# Patient Record
Sex: FEMALE | Race: BLACK OR AFRICAN AMERICAN | Employment: OTHER | URBAN - METROPOLITAN AREA
[De-identification: names, ages, dates, MRNs, and addresses within clinical notes are randomized per-mention and may not be internally consistent; named-entity substitution may affect disease eponyms.]

---

## 2017-06-29 PROBLEM — Z85.3 HX OF BREAST CANCER: Status: ACTIVE | Noted: 2017-06-29

## 2017-08-01 ENCOUNTER — HOSPITAL ENCOUNTER (OUTPATIENT)
Dept: CT IMAGING | Age: 72
Discharge: HOME | End: 2017-08-01
Attending: RADIOLOGY | Admitting: RADIOLOGY
Payer: MEDICARE

## 2017-08-01 VITALS
DIASTOLIC BLOOD PRESSURE: 59 MMHG | RESPIRATION RATE: 18 BRPM | HEART RATE: 81 BPM | HEIGHT: 64 IN | BODY MASS INDEX: 26.8 KG/M2 | WEIGHT: 157 LBS | SYSTOLIC BLOOD PRESSURE: 113 MMHG | OXYGEN SATURATION: 100 % | TEMPERATURE: 97 F

## 2017-08-01 DIAGNOSIS — N28.89 RENAL MASS, LEFT: ICD-10-CM

## 2017-08-01 LAB
ANION GAP BLD CALC-SCNC: 6 MMOL/L (ref 3–18)
APTT PPP: 25.8 SEC (ref 23–36.4)
BUN SERPL-MCNC: 21 MG/DL (ref 7–18)
BUN/CREAT SERPL: 21 (ref 12–20)
CALCIUM SERPL-MCNC: 9.6 MG/DL (ref 8.5–10.1)
CHLORIDE SERPL-SCNC: 103 MMOL/L (ref 100–108)
CO2 SERPL-SCNC: 29 MMOL/L (ref 21–32)
CREAT SERPL-MCNC: 1.01 MG/DL (ref 0.6–1.3)
ERYTHROCYTE [DISTWIDTH] IN BLOOD BY AUTOMATED COUNT: 15 % (ref 11.6–14.5)
GLUCOSE BLD STRIP.AUTO-MCNC: 108 MG/DL (ref 70–110)
GLUCOSE BLD STRIP.AUTO-MCNC: 78 MG/DL (ref 70–110)
GLUCOSE SERPL-MCNC: 109 MG/DL (ref 74–99)
HCT VFR BLD AUTO: 36.7 % (ref 35–45)
HGB BLD-MCNC: 11.7 G/DL (ref 12–16)
INR PPP: 0.9 (ref 0.8–1.2)
MCH RBC QN AUTO: 25.3 PG (ref 24–34)
MCHC RBC AUTO-ENTMCNC: 31.9 G/DL (ref 31–37)
MCV RBC AUTO: 79.3 FL (ref 74–97)
PLATELET # BLD AUTO: 178 K/UL (ref 135–420)
PMV BLD AUTO: 10.4 FL (ref 9.2–11.8)
POTASSIUM SERPL-SCNC: 3.9 MMOL/L (ref 3.5–5.5)
PROTHROMBIN TIME: 12.3 SEC (ref 11.5–15.2)
RBC # BLD AUTO: 4.63 M/UL (ref 4.2–5.3)
SODIUM SERPL-SCNC: 138 MMOL/L (ref 136–145)
WBC # BLD AUTO: 4.6 K/UL (ref 4.6–13.2)

## 2017-08-01 PROCEDURE — 80048 BASIC METABOLIC PNL TOTAL CA: CPT | Performed by: RADIOLOGY

## 2017-08-01 PROCEDURE — 88305 TISSUE EXAM BY PATHOLOGIST: CPT | Performed by: RADIOLOGY

## 2017-08-01 PROCEDURE — 50200 RENAL BIOPSY PERQ: CPT

## 2017-08-01 PROCEDURE — 88334 PATH CONSLTJ SURG CYTO XM EA: CPT | Performed by: RADIOLOGY

## 2017-08-01 PROCEDURE — 74011250636 HC RX REV CODE- 250/636

## 2017-08-01 PROCEDURE — 88333 PATH CONSLTJ SURG CYTO XM 1: CPT | Performed by: RADIOLOGY

## 2017-08-01 PROCEDURE — 74011250636 HC RX REV CODE- 250/636: Performed by: RADIOLOGY

## 2017-08-01 PROCEDURE — 82962 GLUCOSE BLOOD TEST: CPT

## 2017-08-01 PROCEDURE — 85610 PROTHROMBIN TIME: CPT | Performed by: RADIOLOGY

## 2017-08-01 PROCEDURE — 74011000272 HC RX REV CODE- 272

## 2017-08-01 PROCEDURE — 85027 COMPLETE CBC AUTOMATED: CPT | Performed by: RADIOLOGY

## 2017-08-01 PROCEDURE — 85730 THROMBOPLASTIN TIME PARTIAL: CPT | Performed by: RADIOLOGY

## 2017-08-01 RX ORDER — MIDAZOLAM HYDROCHLORIDE 1 MG/ML
INJECTION, SOLUTION INTRAMUSCULAR; INTRAVENOUS
Status: COMPLETED
Start: 2017-08-01 | End: 2017-08-01

## 2017-08-01 RX ORDER — MIDAZOLAM HYDROCHLORIDE 1 MG/ML
1 INJECTION, SOLUTION INTRAMUSCULAR; INTRAVENOUS
Status: DISCONTINUED | OUTPATIENT
Start: 2017-08-01 | End: 2017-08-01 | Stop reason: HOSPADM

## 2017-08-01 RX ORDER — FENTANYL CITRATE 50 UG/ML
50 INJECTION, SOLUTION INTRAMUSCULAR; INTRAVENOUS
Status: DISCONTINUED | OUTPATIENT
Start: 2017-08-01 | End: 2017-08-01 | Stop reason: HOSPADM

## 2017-08-01 RX ORDER — NALOXONE HYDROCHLORIDE 0.4 MG/ML
0.1 INJECTION, SOLUTION INTRAMUSCULAR; INTRAVENOUS; SUBCUTANEOUS
Status: DISCONTINUED | OUTPATIENT
Start: 2017-08-01 | End: 2017-08-01 | Stop reason: HOSPADM

## 2017-08-01 RX ORDER — SODIUM CHLORIDE 0.9 % (FLUSH) 0.9 %
5-10 SYRINGE (ML) INJECTION EVERY 8 HOURS
Status: DISCONTINUED | OUTPATIENT
Start: 2017-08-01 | End: 2017-08-01 | Stop reason: HOSPADM

## 2017-08-01 RX ORDER — FENTANYL CITRATE 50 UG/ML
INJECTION, SOLUTION INTRAMUSCULAR; INTRAVENOUS
Status: COMPLETED
Start: 2017-08-01 | End: 2017-08-01

## 2017-08-01 RX ORDER — SODIUM CHLORIDE 9 MG/ML
20 INJECTION, SOLUTION INTRAVENOUS CONTINUOUS
Status: DISCONTINUED | OUTPATIENT
Start: 2017-08-01 | End: 2017-08-01 | Stop reason: HOSPADM

## 2017-08-01 RX ORDER — SODIUM CHLORIDE 0.9 % (FLUSH) 0.9 %
5-10 SYRINGE (ML) INJECTION AS NEEDED
Status: DISCONTINUED | OUTPATIENT
Start: 2017-08-01 | End: 2017-08-01 | Stop reason: HOSPADM

## 2017-08-01 RX ORDER — FLUMAZENIL 0.1 MG/ML
0.2 INJECTION INTRAVENOUS
Status: DISCONTINUED | OUTPATIENT
Start: 2017-08-01 | End: 2017-08-01 | Stop reason: HOSPADM

## 2017-08-01 RX ADMIN — MIDAZOLAM HYDROCHLORIDE 1 MG: 1 INJECTION, SOLUTION INTRAMUSCULAR; INTRAVENOUS at 12:20

## 2017-08-01 RX ADMIN — MIDAZOLAM HYDROCHLORIDE 1 MG: 1 INJECTION, SOLUTION INTRAMUSCULAR; INTRAVENOUS at 12:25

## 2017-08-01 RX ADMIN — SODIUM CHLORIDE 20 ML/HR: 900 INJECTION, SOLUTION INTRAVENOUS at 09:29

## 2017-08-01 RX ADMIN — MIDAZOLAM HYDROCHLORIDE 0.5 MG: 1 INJECTION, SOLUTION INTRAMUSCULAR; INTRAVENOUS at 12:30

## 2017-08-01 RX ADMIN — FENTANYL CITRATE 50 MCG: 50 INJECTION, SOLUTION INTRAMUSCULAR; INTRAVENOUS at 12:20

## 2017-08-01 RX ADMIN — FENTANYL CITRATE 50 MCG: 50 INJECTION, SOLUTION INTRAMUSCULAR; INTRAVENOUS at 12:25

## 2017-08-01 RX ADMIN — FENTANYL CITRATE 50 MCG: 50 INJECTION INTRAMUSCULAR; INTRAVENOUS at 12:20

## 2017-08-01 RX ADMIN — FENTANYL CITRATE 25 MCG: 50 INJECTION, SOLUTION INTRAMUSCULAR; INTRAVENOUS at 12:30

## 2017-08-01 RX ADMIN — GELATIN ABSORBABLE SPONGE 12-7 MM 1 PACKAGE: 12-7 MISC at 12:38

## 2017-08-01 NOTE — PERIOP NOTES
I have reviewed discharge instructions with the patient and sister. The patient and sister verbalized understanding. Patient armband removed and shredded.

## 2017-08-01 NOTE — H&P
OUTPATIENT HISTORY AND PHYSICAL      Today 8/1/2017     Indication/Symptoms:   Judy Garcia is a 74 y.o. female with a left renal mass who presents to IR for an image-guided left renal mass biopsy with moderate sedation. Current Meds:    Prior to Admission medications    Medication Sig Start Date End Date Taking? Authorizing Provider   gabapentin (NEURONTIN) 100 mg capsule Take  by mouth three (3) times daily. Yes Historical Provider   carvedilol (COREG) 12.5 mg tablet Take  by mouth two (2) times daily (with meals). Yes Historical Provider   metFORMIN (GLUCOPHAGE) 500 mg tablet Take  by mouth two (2) times daily (with meals). Yes Historical Provider   HYDROCHLOROTHIAZIDE PO Take  by mouth daily. Yes Historical Provider   travoprost (TRAVATAN Z) 0.004 % ophthalmic solution Administer 1 Drop to both eyes every evening. Yes Historical Provider   oxybutynin chloride XL (DITROPAN XL) 10 mg CR tablet Take 10 mg by mouth daily. Historical Provider   [facility] Blue-Sod Phos-PhSal-Hyo (URIBEL) 118-10-40.8-36 mg cap capsule Take 1 Cap by mouth four (4) times daily. Historical Provider   acetaminophen (TYLENOL) 325 mg tablet Take 325 mg by mouth as needed. Historical Provider   aspirin delayed-release 81 mg tablet 81 mg. Historical Provider   oxyCODONE-acetaminophen (PERCOCET) 5-325 mg per tablet One-half to one po every day-bid prn pain 12/10/15   Eliza Ferris MD   losartan-hydrochlorothiazide [facility]) 100-12.5 mg per tablet Take 1 Tab by mouth daily. Historical Provider   NOVOTWIST 32 x 1/5 \" ndle  4/22/14   Historical Provider   multivitamin with iron tablet Take 1 Tab by mouth daily. 6/11/13   Alma Rosa Gan NP   Liraglutide (VICTOZA 3-VALERIANO) 0.6 mg/0.1 mL (18 mg/3 mL) sub-q pen 0.6 mg by SubCUTAneous route. Historical Provider   Omeprazole delayed release (PRILOSEC D/R) 20 mg tablet Take 20 mg by mouth daily.     Historical Provider   glipiZIDE (GLUCOTROL) 10 mg tablet Take 10 mg by mouth two (2) times a day. Historical Provider   lisinopril (PRINIVIL, ZESTRIL) 20 mg tablet Take  by mouth daily. Historical Provider       Allergies:    No Known Allergies    Comorbid Conditions:    Past Medical History:   Diagnosis Date    Anemia NEC     Arthritis     Diabetes ([address]     Glaucoma     Hypertension     Lumbar stenosis     MDS (myelodysplastic syndrome) (HCC)     MGUS (monoclonal gammopathy of unknown significance)           Past Surgical History:   Procedure Laterality Date    BIOPSY BONE MARROW  5/11/2010    ENDOSCOPY, COLON, DIAGNOSTIC  2005    HX BREAST LUMPECTOMY  2015    HX CARPAL TUNNEL RELEASE      Left    HX CATARACT REMOVAL Bilateral 2012    HX HYSTERECTOMY  6/1985    HX LUMBAR FUSION  3/2000    HX LUMBAR LAMINECTOMY  3/2000    NEUROLOGICAL PROCEDURE UNLISTED  2002    Spinal injection     Data:    Visit Vitals    /75 (BP 1 Location: Left arm, BP Patient Position: At rest)    Pulse 84    Temp 97.5 °F (36.4 °C)    Resp 16    Ht 5' 4\" (1.626 m)    Wt 71.2 kg (157 lb)    SpO2 100%    BMI 26.95 kg/m2   :  Recent Labs      08/01/17   0925   PLT  178     Recent Labs      08/01/17   0925   INR  0.9   APTT  25.8       The H & P and/or progress notes and any available imaging were reviewed. The risks, indications and possible alternatives to the procedure, including doing nothing, were discussed and informed consent was obtained. Physical Exam:      Mental status:   Alert and oriented. Examination specific to the procedure proposed to be performed and any co morbid conditions:   Mallampati classification 2 ,  ASA 2   Heart:   RRR. Lungs:   CTAB. No wheezes, rales or rhonchi. The patient is an appropriate candidate to undergo the planned procedure and sedation.     Tamiko Fitzgerald MD

## 2017-08-01 NOTE — PROGRESS NOTES
TRANSFER - OUT REPORT:    Verbal report given to Judit Hilton RN(name) on Shi Tamayo  being transferred to Phase 2(unit) for routine post - op       Report consisted of patients Situation, Background, Assessment and   Recommendations(SBAR). Information from the following report(s) SBAR, Kardex and MAR was reviewed with the receiving nurse. Lines:   Peripheral IV 08/01/17 Right Arm (Active)   Site Assessment Clean, dry, & intact 8/1/2017  9:28 AM   Phlebitis Assessment 0 8/1/2017  9:28 AM   Infiltration Assessment 0 8/1/2017  9:28 AM   Dressing Status Clean, dry, & intact 8/1/2017  9:28 AM   Dressing Type Tape;Transparent 8/1/2017  9:28 AM   Hub Color/Line Status Pink; Infusing 8/1/2017  9:28 AM   Alcohol Cap Used No 8/1/2017  9:28 AM        Opportunity for questions and clarification was provided.       Patient transported with:   [employer]

## 2017-08-01 NOTE — IP AVS SNAPSHOT
Karyle Olp 
 
 
 [address] [address] Patient: Barbara Mccarty MRN: [id number] ICL:9/2/2029 You are allergic to the following No active allergies Recent Documentation Height Weight BMI OB Status Smoking Status 1.626 m 71.2 kg 26.95 kg/m2 Hysterectomy Former Smoker Emergency Contacts Name Discharge Info Relation Home Work Mobile BCira Chen  Other Relative [6] [phone] Jessica Rankin  Other Relative [6] [phone] About your hospitalization You were admitted on:  August 1, 2017 You [facility] in the:  [facility] [address] You were discharged on:  August 1, 2017 Unit phone number:  [phone] Why you were hospitalized Your primary diagnosis was:  Not on File Providers Seen During Your Hospitalizations Provider Role Specialty Primary office phone Heather Zaman MD Attending Provider Radiology [phone] Your Primary Care Physician (PCP) Primary Care Physician Office Phone Office Fax Jamshid Miller [phone] Follow-up Information Follow up With Details Comments Contact Info Antwon Nieves MD   [facility] [address] 
[phone] Heather Zaman MD Schedule an appointment as soon as possible for a visit in 1 week(s)  [address] [place] [address] 
[phone] Current Discharge Medication List  
  
ASK your doctor about these medications Dose & Instructions Dispensing Information Comments Morning Noon Evening Bedtime  
 acetaminophen 325 mg tablet Commonly known as:  TYLENOL Your last dose was: Your next dose is:    
   
   
 Dose:  325 mg Take 325 mg by mouth as needed. Refills:  0  
     
   
   
   
  
 aspirin delayed-release 81 mg tablet Your last dose was: Your next dose is:    
   
   
 Dose:  81 mg  
81 mg. Refills:  0  
     
   
   
   
  
 carvedilol 12.5 mg tablet Commonly known as:  Olinda Ready Your last dose was: Your next dose is: Take  by mouth two (2) times daily (with meals). Refills:  0 DITROPAN XL 10 mg CR tablet Generic drug:  oxybutynin chloride XL Your last dose was: Your next dose is:    
   
   
 Dose:  10 mg Take 10 mg by mouth daily. Refills:  0  
     
   
   
   
  
 gabapentin 100 mg capsule Commonly known as:  NEURONTIN Your last dose was: Your next dose is: Take  by mouth three (3) times daily. Refills:  0  
     
   
   
   
  
 glipiZIDE 10 mg tablet Commonly known as:  Marlene Lands Your last dose was: Your next dose is:    
   
   
 Dose:  10 mg Take 10 mg by mouth two (2) times a day. Refills:  0 HYDROCHLOROTHIAZIDE PO Your last dose was: Your next dose is: Take  by mouth daily. Refills:  0  
     
   
   
   
  
 lisinopril 20 mg tablet Commonly known as:  Chauncey El Your last dose was: Your next dose is: Take  by mouth daily. Refills:  0  
     
   
   
   
  
 losartan-hydroCHLOROthiazide 100-12.5 mg per tablet Commonly known as:  HYZAAR Your last dose was: Your next dose is:    
   
   
 Dose:  1 Tab Take 1 Tab by mouth daily. Refills:  0  
     
   
   
   
  
 metFORMIN 500 mg tablet Commonly known as:  GLUCOPHAGE Your last dose was: Your next dose is: Take  by mouth two (2) times daily (with meals). Refills:  0  
     
   
   
   
  
 multivitamin with iron tablet Your last dose was: Your next dose is:    
   
   
 Dose:  1 Tab Take 1 Tab by mouth daily. Quantity:  30 Each Refills:  5 NOVOTWIST 32 gauge x 1/5\" Ndle Generic drug:  pen needle, diabetic Your last dose was: Your next dose is:    
   
   
  Refills:  0 Omeprazole delayed release 20 mg tablet Commonly known as:  PRILOSEC D/R Your last dose was: Your next dose is:    
   
   
 Dose:  20 mg Take 20 mg by mouth daily. Refills:  0  
     
   
   
   
  
 oxyCODONE-acetaminophen 5-325 mg per tablet Commonly known as:  PERCOCET Your last dose was: Your next dose is:    
   
   
 One-half to one po every day-bid prn pain Quantity:  45 Tab Refills:  0  
     
   
   
   
  
 TRAVATAN Z 0.004 % ophthalmic solution Generic drug:  travoprost  
   
Your last dose was: Your next dose is:    
   
   
 Dose:  1 Drop Administer 1 Drop to both eyes every evening. Refills:  0  
     
   
   
   
  
 URIBEL 118-10-40.8-36 mg Cap capsule Generic drug:  Mth-Me Blue-Sod Phos-PhSal-Hyo Your last dose was: Your next dose is:    
   
   
 Dose:  1 Cap Take 1 Cap by mouth four (4) times daily. Refills:  0  
     
   
   
   
  
 VICTOZA 0.6 mg/0.1 mL (18 mg/3 mL) sub-q pen Generic drug:  Liraglutide Your last dose was: Your next dose is:    
   
   
 Dose:  0.6 mg  
0.6 mg by SubCUTAneous route. Refills:  0 Discharge Instructions Natalie 67 General Instructions: A biopsy is the removal of a small piece of tissue for microscopic examination or testing. Healthy tissue can be obtained for the purpose of tissue-type matching for transplants. Unhealthy tissues are more commonly biopsied to diagnose disease. Renal Biopsy: This procedure is sometimes done to evaluate a transplanted kidney. It is also used to evaluate unexplained decrease in kidney function, blood, or protein in the urine.  You may see bright red blood in the urine the first 24 hours after the test. If the bleeding lasts longer, you need to call your doctor. There is a risk of infection and bleeding into the muscle, which may cause soreness. Home Care Instructions: You may resume your regular diet and medication regimen. Do not drink alcohol, drive, or make any important legal decisions in the next 24 hours. Do not lift anything heavier than a gallon of milk until the soreness goes away. You may use over the counter acetaminophen or ibuprofen for the soreness. You may apply an ice pack to the affected area for 20-30 minutes at time for the first 24 hours. After that, you may apply a heat pack. Remove dressing in 24 hours. May shower once the dressing is removed. Allow soap and water to run over the biopsy site. Pat biopsy site dry after your shower. No tub baths, swimming, or hot tub until biopsy site has healed. Call If: You should call your Physician if you have any questions or concerns about the biopsy site. Call if you should have increased pain, fever, redness, drainage, or bleeding more than a small spot on the bandage. Follow-Up Instructions: Please see your ordering doctor as he/she has requested. DISCHARGE SUMMARY from Nurse The following personal items are in your possession at time of discharge: 
 
Dental Appliances: None Home Medications: None Jewelry: Watch Clothing: Shirt, Pants, Undergarments, Socks, Footwear Other Valuables: Moon Dent (comment) (medical cards) PATIENT INSTRUCTIONS: 
 
 
F-face looks uneven A-arms unable to move or move unevenly S-speech slurred or non-existent T-time-call 911 as soon as signs and symptoms begin-DO NOT go Back to bed or wait to see if you get better-TIME IS BRAIN. Warning Signs of HEART ATTACK Call 911 if you have these symptoms: 
? Chest discomfort.  Most heart attacks involve discomfort in the center of the chest that lasts more than a few minutes, or that goes away and comes back. It can feel like uncomfortable pressure, squeezing, fullness, or pain. ? Discomfort in other areas of the upper body. Symptoms can include pain or discomfort in one or both arms, the back, neck, jaw, or stomach. ? Shortness of breath with or without chest discomfort. ? Other signs may include breaking out in a cold sweat, nausea, or lightheadedness. Don't wait more than five minutes to call [address]! Fast action can save your life. Calling 911 is almost always the fastest way to get lifesaving treatment. Emergency Medical Services staff can begin treatment when they arrive  up to an hour sooner than if someone gets to the hospital by car. The discharge information has been reviewed with the patient. The patient verbalized understanding. Discharge medications reviewed with the patient and appropriate educational materials and side effects teaching were provided. Discharge Orders None Introducing [facility] SERVICES! Allyssa Padilla introduces [employer] patient portal. Now you can access parts of your medical record, email your doctor's office, and request medication refills online. 1. In your internet browser, go to [email or web address] 2. Click on the First Time User? Click Here link in the Sign In box. You will see the New Member Sign Up page. 3. Enter your [employer] Access Code exactly as it appears below. You will not need to use this code after youve completed the sign-up process. If you do not sign up before the expiration date, you must request a new code. · [employer] Access Code: [email or web address] Expires: 9/27/2017  4:35 PM 
 
4. Enter the last four digits of your Social Security Number (xxxx) and Date of Birth (mm/dd/yyyy) as indicated and click Submit. You will be taken to the next sign-up page. 5. Create a [employer] ID. This will be your [employer] login ID and cannot be changed, so think of one that is secure and easy to remember. 6. Create a [employer] password. You can change your password at any time. 7. Enter your Password Reset Question and Answer. This can be used at a later time if you forget your password. 8. Enter your e-mail address. You will receive e-mail notification when new information is available in [address] 9. Click Sign Up. You can now view and download portions of your medical record. 10. Click the Download Summary menu link to download a portable copy of your medical information. If you have questions, please visit the Frequently Asked Questions section of the [employer] website. Remember, [employer] is NOT to be used for urgent needs. For medical emergencies, dial 911. Now available from your iPhone and Android! General Information Please provide this summary of care documentation to your next provider. Patient Signature:  ____________________________________________________________ Date:  ____________________________________________________________  
  
Dorina Lam Provider Signature:  ____________________________________________________________ Date:  ____________________________________________________________

## 2017-08-01 NOTE — DISCHARGE INSTRUCTIONS
Clarissa INSTRUCTIONS    General Instructions:     A biopsy is the removal of a small piece of tissue for microscopic examination or testing. Healthy tissue can be obtained for the purpose of tissue-type matching for transplants. Unhealthy tissues are more commonly biopsied to diagnose disease. Renal Biopsy: This procedure is sometimes done to evaluate a transplanted kidney. It is also used to evaluate unexplained decrease in kidney function, blood, or protein in the urine. You may see bright red blood in the urine the first 24 hours after the test. If the bleeding lasts longer, you need to call your doctor. There is a risk of infection and bleeding into the muscle, which may cause soreness. Home Care Instructions: You may resume your regular diet and medication regimen. Do not drink alcohol, drive, or make any important legal decisions in the next 24 hours. Do not lift anything heavier than a gallon of milk until the soreness goes away. You may use over the counter acetaminophen or ibuprofen for the soreness. You may apply an ice pack to the affected area for 20-30 minutes at time for the first 24 hours. After that, you may apply a heat pack. Remove dressing in 24 hours. May shower once the dressing is removed. Allow soap and water to run over the biopsy site. Pat biopsy site dry after your shower. No tub baths, swimming, or hot tub until biopsy site has healed. Call If: You should call your Physician if you have any questions or concerns about the biopsy site. Call if you should have increased pain, fever, redness, drainage, or bleeding more than a small spot on the bandage. Follow-Up Instructions: Please see your ordering doctor as he/she has requested.     DISCHARGE SUMMARY from Nurse    The following personal items are in your possession at time of discharge:    Dental Appliances: None        Home Medications: None  Jewelry: Watch  Clothing: Shirt, Pants, Undergarments, Socks, Footwear  Other Valuables: Cane, Purse, Other (comment) (medical cards)     PATIENT INSTRUCTIONS:    After general anesthesia or intravenous sedation, for 24 hours or while taking prescription Narcotics:  · Limit your activities  · Do not drive and operate hazardous machinery  · Do not make important personal or business decisions  · Do  not drink alcoholic beverages  · If you have not urinated within 8 hours after discharge, please contact your surgeon on call. Report the following to your surgeon:  · Excessive pain, swelling, redness or odor of or around the surgical area  · Temperature over 100.5  · Nausea and vomiting lasting longer than 4 hours or if unable to take medications  · Any signs of decreased circulation or nerve impairment to extremity: change in color, persistent  numbness, tingling, coldness or increase pain  · Any questions    *  Please give a list of your current medications to your Primary Care Provider. *  Please update this list whenever your medications are discontinued, doses are      changed, or new medications (including over-the-counter products) are added. *  Please carry medication information at all times in case of emergency situations. These are general instructions for a healthy lifestyle:    No smoking/ No tobacco products/ Avoid exposure to second hand smoke    Surgeon General's Warning:  Quitting smoking now greatly reduces serious risk to your health. Obesity, smoking, and sedentary lifestyle greatly increases your risk for illness    A healthy diet, regular physical exercise & weight monitoring are important for maintaining a healthy lifestyle    You may be retaining fluid if you have a history of heart failure or if you experience any of the following symptoms:  Weight gain of 3 pounds or more overnight or 5 pounds in a week, increased swelling in our hands or feet or shortness of breath while lying flat in bed.   Please call your doctor as soon as you notice any of these symptoms; do not wait until your next office visit. Recognize signs and symptoms of STROKE:    F-face looks uneven    A-arms unable to move or move unevenly    S-speech slurred or non-existent    T-time-call 911 as soon as signs and symptoms begin-DO NOT go       Back to bed or wait to see if you get better-TIME IS BRAIN. Warning Signs of HEART ATTACK     Call 911 if you have these symptoms:   Chest discomfort. Most heart attacks involve discomfort in the center of the chest that lasts more than a few minutes, or that goes away and comes back. It can feel like uncomfortable pressure, squeezing, fullness, or pain.  Discomfort in other areas of the upper body. Symptoms can include pain or discomfort in one or both arms, the back, neck, jaw, or stomach.  Shortness of breath with or without chest discomfort.  Other signs may include breaking out in a cold sweat, nausea, or lightheadedness. Don't wait more than five minutes to call 911 - MINUTES MATTER! Fast action can save your life. Calling 911 is almost always the fastest way to get lifesaving treatment. Emergency Medical Services staff can begin treatment when they arrive -- up to an hour sooner than if someone gets to the hospital by car. The discharge information has been reviewed with the patient. The patient verbalized understanding. Discharge medications reviewed with the patient and appropriate educational materials and side effects teaching were provided.

## 2017-09-21 ENCOUNTER — ANESTHESIA (OUTPATIENT)
Dept: SURGERY | Age: 72
End: 2017-09-21
Payer: MEDICARE

## 2017-09-21 ENCOUNTER — ANESTHESIA EVENT (OUTPATIENT)
Dept: SURGERY | Age: 72
End: 2017-09-21
Payer: MEDICARE

## 2017-09-21 ENCOUNTER — HOSPITAL ENCOUNTER (OUTPATIENT)
Dept: CT IMAGING | Age: 72
Setting detail: OBSERVATION
Discharge: HOME | End: 2017-09-22
Attending: RADIOLOGY | Admitting: INTERNAL MEDICINE
Payer: MEDICARE

## 2017-09-21 DIAGNOSIS — C64.1 MALIGNANT NEOPLASM OF RIGHT KIDNEY, EXCEPT RENAL PELVIS (HCC): ICD-10-CM

## 2017-09-21 PROBLEM — C64.9 RENAL CELL CANCER (HCC): Status: ACTIVE | Noted: 2017-09-21

## 2017-09-21 PROBLEM — C64.9 RENAL CELL CARCINOMA (HCC): Status: ACTIVE | Noted: 2017-09-21

## 2017-09-21 LAB
ANION GAP SERPL CALC-SCNC: 7 MMOL/L (ref 3–18)
APTT PPP: 26.9 SEC (ref 23–36.4)
BUN SERPL-MCNC: 21 MG/DL (ref 7–18)
BUN/CREAT SERPL: 20 (ref 12–20)
CALCIUM SERPL-MCNC: 9.2 MG/DL (ref 8.5–10.1)
CHLORIDE SERPL-SCNC: 106 MMOL/L (ref 100–108)
CO2 SERPL-SCNC: 26 MMOL/L (ref 21–32)
CREAT SERPL-MCNC: 1.04 MG/DL (ref 0.6–1.3)
ERYTHROCYTE [DISTWIDTH] IN BLOOD BY AUTOMATED COUNT: 14.5 % (ref 11.6–14.5)
GLUCOSE BLD STRIP.AUTO-MCNC: 103 MG/DL (ref 70–110)
GLUCOSE BLD STRIP.AUTO-MCNC: 120 MG/DL (ref 70–110)
GLUCOSE BLD STRIP.AUTO-MCNC: 190 MG/DL (ref 70–110)
GLUCOSE SERPL-MCNC: 110 MG/DL (ref 74–99)
HCT VFR BLD AUTO: 34.6 % (ref 35–45)
HGB BLD-MCNC: 11.1 G/DL (ref 12–16)
INR PPP: 0.9 (ref 0.8–1.2)
MCH RBC QN AUTO: 25.5 PG (ref 24–34)
MCHC RBC AUTO-ENTMCNC: 32.1 G/DL (ref 31–37)
MCV RBC AUTO: 79.5 FL (ref 74–97)
PLATELET # BLD AUTO: 178 K/UL (ref 135–420)
PMV BLD AUTO: 11.2 FL (ref 9.2–11.8)
POTASSIUM SERPL-SCNC: 5 MMOL/L (ref 3.5–5.5)
PROTHROMBIN TIME: 11.9 SEC (ref 11.5–15.2)
RBC # BLD AUTO: 4.35 M/UL (ref 4.2–5.3)
SODIUM SERPL-SCNC: 139 MMOL/L (ref 136–145)
WBC # BLD AUTO: 4.6 K/UL (ref 4.6–13.2)

## 2017-09-21 PROCEDURE — 85610 PROTHROMBIN TIME: CPT | Performed by: RADIOLOGY

## 2017-09-21 PROCEDURE — 77030026438 HC STYL ET INTUB CARD -A: Performed by: ANESTHESIOLOGY

## 2017-09-21 PROCEDURE — 99218 HC RM OBSERVATION: CPT

## 2017-09-21 PROCEDURE — 74011250637 HC RX REV CODE- 250/637: Performed by: RADIOLOGY

## 2017-09-21 PROCEDURE — 74011250636 HC RX REV CODE- 250/636: Performed by: RADIOLOGY

## 2017-09-21 PROCEDURE — 74011000250 HC RX REV CODE- 250

## 2017-09-21 PROCEDURE — 77030008683 HC TU ET CUF COVD -A: Performed by: ANESTHESIOLOGY

## 2017-09-21 PROCEDURE — 80048 BASIC METABOLIC PNL TOTAL CA: CPT | Performed by: RADIOLOGY

## 2017-09-21 PROCEDURE — 76060000035 HC ANESTHESIA 2 TO 2.5 HR

## 2017-09-21 PROCEDURE — 74011250636 HC RX REV CODE- 250/636

## 2017-09-21 PROCEDURE — 85730 THROMBOPLASTIN TIME PARTIAL: CPT | Performed by: RADIOLOGY

## 2017-09-21 PROCEDURE — 50592 PERC RF ABLATE RENAL TUMOR: CPT

## 2017-09-21 PROCEDURE — 82962 GLUCOSE BLOOD TEST: CPT

## 2017-09-21 PROCEDURE — 74011250636 HC RX REV CODE- 250/636: Performed by: INTERNAL MEDICINE

## 2017-09-21 PROCEDURE — 85027 COMPLETE CBC AUTOMATED: CPT | Performed by: RADIOLOGY

## 2017-09-21 RX ORDER — CIPROFLOXACIN 2 MG/ML
400 INJECTION, SOLUTION INTRAVENOUS ONCE
Status: COMPLETED | OUTPATIENT
Start: 2017-09-22 | End: 2017-09-22

## 2017-09-21 RX ORDER — DEXTROSE 50 % IN WATER (D50W) INTRAVENOUS SYRINGE
25-50 AS NEEDED
Status: DISCONTINUED | OUTPATIENT
Start: 2017-09-21 | End: 2017-09-21 | Stop reason: HOSPADM

## 2017-09-21 RX ORDER — ONDANSETRON 2 MG/ML
4 INJECTION INTRAMUSCULAR; INTRAVENOUS
Status: DISCONTINUED | OUTPATIENT
Start: 2017-09-21 | End: 2017-09-22 | Stop reason: HOSPADM

## 2017-09-21 RX ORDER — FENTANYL CITRATE 50 UG/ML
INJECTION, SOLUTION INTRAMUSCULAR; INTRAVENOUS AS NEEDED
Status: DISCONTINUED | OUTPATIENT
Start: 2017-09-21 | End: 2017-09-21 | Stop reason: HOSPADM

## 2017-09-21 RX ORDER — CIPROFLOXACIN 2 MG/ML
400 INJECTION, SOLUTION INTRAVENOUS EVERY 24 HOURS
Status: DISCONTINUED | OUTPATIENT
Start: 2017-09-21 | End: 2017-09-21 | Stop reason: SDUPTHER

## 2017-09-21 RX ORDER — ONDANSETRON 2 MG/ML
4 INJECTION INTRAMUSCULAR; INTRAVENOUS ONCE
Status: DISCONTINUED | OUTPATIENT
Start: 2017-09-21 | End: 2017-09-21 | Stop reason: HOSPADM

## 2017-09-21 RX ORDER — PROPOFOL 10 MG/ML
INJECTION, EMULSION INTRAVENOUS AS NEEDED
Status: DISCONTINUED | OUTPATIENT
Start: 2017-09-21 | End: 2017-09-21 | Stop reason: HOSPADM

## 2017-09-21 RX ORDER — SODIUM CHLORIDE 9 MG/ML
20 INJECTION, SOLUTION INTRAVENOUS CONTINUOUS
Status: DISCONTINUED | OUTPATIENT
Start: 2017-09-21 | End: 2017-09-22 | Stop reason: HOSPADM

## 2017-09-21 RX ORDER — IBUPROFEN 200 MG
200 TABLET ORAL AS NEEDED
COMMUNITY
End: 2017-09-22

## 2017-09-21 RX ORDER — SUCCINYLCHOLINE CHLORIDE 20 MG/ML
INJECTION INTRAMUSCULAR; INTRAVENOUS AS NEEDED
Status: DISCONTINUED | OUTPATIENT
Start: 2017-09-21 | End: 2017-09-21 | Stop reason: HOSPADM

## 2017-09-21 RX ORDER — SODIUM CHLORIDE 9 MG/ML
50 INJECTION, SOLUTION INTRAVENOUS CONTINUOUS
Status: DISCONTINUED | OUTPATIENT
Start: 2017-09-21 | End: 2017-09-22 | Stop reason: HOSPADM

## 2017-09-21 RX ORDER — GLYCOPYRROLATE 0.2 MG/ML
INJECTION INTRAMUSCULAR; INTRAVENOUS AS NEEDED
Status: DISCONTINUED | OUTPATIENT
Start: 2017-09-21 | End: 2017-09-21 | Stop reason: HOSPADM

## 2017-09-21 RX ORDER — EPHEDRINE SULFATE/0.9% NACL/PF 25 MG/5 ML
SYRINGE (ML) INTRAVENOUS AS NEEDED
Status: DISCONTINUED | OUTPATIENT
Start: 2017-09-21 | End: 2017-09-21 | Stop reason: HOSPADM

## 2017-09-21 RX ORDER — HYDROCODONE BITARTRATE AND ACETAMINOPHEN 5; 325 MG/1; MG/1
1 TABLET ORAL
Status: DISCONTINUED | OUTPATIENT
Start: 2017-09-21 | End: 2017-09-22 | Stop reason: HOSPADM

## 2017-09-21 RX ORDER — ROCURONIUM BROMIDE 10 MG/ML
INJECTION, SOLUTION INTRAVENOUS AS NEEDED
Status: DISCONTINUED | OUTPATIENT
Start: 2017-09-21 | End: 2017-09-21 | Stop reason: HOSPADM

## 2017-09-21 RX ORDER — MAGNESIUM SULFATE 100 %
4 CRYSTALS MISCELLANEOUS AS NEEDED
Status: DISCONTINUED | OUTPATIENT
Start: 2017-09-21 | End: 2017-09-21 | Stop reason: HOSPADM

## 2017-09-21 RX ORDER — ONDANSETRON 2 MG/ML
INJECTION INTRAMUSCULAR; INTRAVENOUS AS NEEDED
Status: DISCONTINUED | OUTPATIENT
Start: 2017-09-21 | End: 2017-09-21 | Stop reason: HOSPADM

## 2017-09-21 RX ORDER — LIDOCAINE HYDROCHLORIDE 20 MG/ML
INJECTION, SOLUTION EPIDURAL; INFILTRATION; INTRACAUDAL; PERINEURAL AS NEEDED
Status: DISCONTINUED | OUTPATIENT
Start: 2017-09-21 | End: 2017-09-21 | Stop reason: HOSPADM

## 2017-09-21 RX ORDER — SODIUM CHLORIDE, SODIUM LACTATE, POTASSIUM CHLORIDE, CALCIUM CHLORIDE 600; 310; 30; 20 MG/100ML; MG/100ML; MG/100ML; MG/100ML
75 INJECTION, SOLUTION INTRAVENOUS CONTINUOUS
Status: DISCONTINUED | OUTPATIENT
Start: 2017-09-21 | End: 2017-09-21 | Stop reason: HOSPADM

## 2017-09-21 RX ORDER — ZOLPIDEM TARTRATE 5 MG/1
5 TABLET ORAL
Status: DISCONTINUED | OUTPATIENT
Start: 2017-09-21 | End: 2017-09-22 | Stop reason: HOSPADM

## 2017-09-21 RX ORDER — NEOSTIGMINE METHYLSULFATE 5 MG/5 ML
SYRINGE (ML) INTRAVENOUS AS NEEDED
Status: DISCONTINUED | OUTPATIENT
Start: 2017-09-21 | End: 2017-09-21 | Stop reason: HOSPADM

## 2017-09-21 RX ORDER — FENTANYL CITRATE 50 UG/ML
INJECTION, SOLUTION INTRAMUSCULAR; INTRAVENOUS
Status: DISPENSED
Start: 2017-09-21 | End: 2017-09-21

## 2017-09-21 RX ORDER — SODIUM CHLORIDE 0.9 % (FLUSH) 0.9 %
5-10 SYRINGE (ML) INJECTION AS NEEDED
Status: DISCONTINUED | OUTPATIENT
Start: 2017-09-21 | End: 2017-09-21 | Stop reason: HOSPADM

## 2017-09-21 RX ORDER — NALOXONE HYDROCHLORIDE 0.4 MG/ML
0.2 INJECTION, SOLUTION INTRAMUSCULAR; INTRAVENOUS; SUBCUTANEOUS AS NEEDED
Status: DISCONTINUED | OUTPATIENT
Start: 2017-09-21 | End: 2017-09-21 | Stop reason: HOSPADM

## 2017-09-21 RX ORDER — HEPARIN SODIUM 5000 [USP'U]/ML
5000 INJECTION, SOLUTION INTRAVENOUS; SUBCUTANEOUS EVERY 12 HOURS
Status: DISCONTINUED | OUTPATIENT
Start: 2017-09-21 | End: 2017-09-22 | Stop reason: HOSPADM

## 2017-09-21 RX ORDER — CIPROFLOXACIN 2 MG/ML
400 INJECTION, SOLUTION INTRAVENOUS
Status: COMPLETED | OUTPATIENT
Start: 2017-09-21 | End: 2017-09-21

## 2017-09-21 RX ORDER — DEXAMETHASONE SODIUM PHOSPHATE 4 MG/ML
INJECTION, SOLUTION INTRA-ARTICULAR; INTRALESIONAL; INTRAMUSCULAR; INTRAVENOUS; SOFT TISSUE AS NEEDED
Status: DISCONTINUED | OUTPATIENT
Start: 2017-09-21 | End: 2017-09-21 | Stop reason: HOSPADM

## 2017-09-21 RX ORDER — HYDROMORPHONE HYDROCHLORIDE 2 MG/ML
0.5 INJECTION, SOLUTION INTRAMUSCULAR; INTRAVENOUS; SUBCUTANEOUS
Status: DISCONTINUED | OUTPATIENT
Start: 2017-09-21 | End: 2017-09-21 | Stop reason: HOSPADM

## 2017-09-21 RX ORDER — INSULIN LISPRO 100 [IU]/ML
INJECTION, SOLUTION INTRAVENOUS; SUBCUTANEOUS ONCE
Status: DISCONTINUED | OUTPATIENT
Start: 2017-09-21 | End: 2017-09-21 | Stop reason: HOSPADM

## 2017-09-21 RX ADMIN — FENTANYL CITRATE 50 MCG: 50 INJECTION, SOLUTION INTRAMUSCULAR; INTRAVENOUS at 10:48

## 2017-09-21 RX ADMIN — Medication 5 MG: at 09:55

## 2017-09-21 RX ADMIN — HYDROCODONE BITARTRATE AND ACETAMINOPHEN 1 TABLET: 5; 325 TABLET ORAL at 20:43

## 2017-09-21 RX ADMIN — SODIUM CHLORIDE 20 ML/HR: 900 INJECTION, SOLUTION INTRAVENOUS at 07:05

## 2017-09-21 RX ADMIN — HEPARIN SODIUM 5000 UNITS: 5000 INJECTION, SOLUTION INTRAVENOUS; SUBCUTANEOUS at 21:15

## 2017-09-21 RX ADMIN — GLYCOPYRROLATE 0.4 MG: 0.2 INJECTION INTRAMUSCULAR; INTRAVENOUS at 10:59

## 2017-09-21 RX ADMIN — LIDOCAINE HYDROCHLORIDE 80 MG: 20 INJECTION, SOLUTION EPIDURAL; INFILTRATION; INTRACAUDAL; PERINEURAL at 09:20

## 2017-09-21 RX ADMIN — ONDANSETRON 4 MG: 2 INJECTION INTRAMUSCULAR; INTRAVENOUS at 09:35

## 2017-09-21 RX ADMIN — SUCCINYLCHOLINE CHLORIDE 100 MG: 20 INJECTION INTRAMUSCULAR; INTRAVENOUS at 09:20

## 2017-09-21 RX ADMIN — PROPOFOL 160 MG: 10 INJECTION, EMULSION INTRAVENOUS at 09:20

## 2017-09-21 RX ADMIN — DEXAMETHASONE SODIUM PHOSPHATE 4 MG: 4 INJECTION, SOLUTION INTRA-ARTICULAR; INTRALESIONAL; INTRAMUSCULAR; INTRAVENOUS; SOFT TISSUE at 09:32

## 2017-09-21 RX ADMIN — ROCURONIUM BROMIDE 20 MG: 10 INJECTION, SOLUTION INTRAVENOUS at 10:00

## 2017-09-21 RX ADMIN — CIPROFLOXACIN 400 MG: 2 INJECTION, SOLUTION INTRAVENOUS at 09:16

## 2017-09-21 RX ADMIN — Medication 5 MG: at 09:51

## 2017-09-21 RX ADMIN — FENTANYL CITRATE 50 MCG: 50 INJECTION, SOLUTION INTRAMUSCULAR; INTRAVENOUS at 09:19

## 2017-09-21 RX ADMIN — Medication 3 MG: at 10:59

## 2017-09-21 NOTE — IP AVS SNAPSHOT
[address] Patient: Licha Tavera MRN: [id number] Current Discharge Medication List  
  
START taking these medications Dose & Instructions Dispensing Information Comments Morning Noon Evening Bedtime  
 ciprofloxacin HCl 500 mg tablet Commonly known as:  CIPRO Your last dose was: Your next dose is:    
   
   
 Dose:  500 mg Take 1 Tab by mouth two (2) times a day for 7 days. Quantity:  14 Tab Refills:  0 CONTINUE these medications which have NOT CHANGED Dose & Instructions Dispensing Information Comments Morning Noon Evening Bedtime  
 acetaminophen 325 mg tablet Commonly known as:  TYLENOL Your last dose was: Your next dose is:    
   
   
 Dose:  325 mg Take 325 mg by mouth as needed. Refills:  0  
     
   
   
   
  
 aspirin delayed-release 81 mg tablet Your last dose was: Your next dose is:    
   
   
 Dose:  81 mg  
81 mg. Refills:  0  
     
   
   
   
  
 carvedilol 12.5 mg tablet Commonly known as:  Carrolyn Peabody Your last dose was: Your next dose is: Take  by mouth two (2) times daily (with meals). Refills:  0 DITROPAN XL 10 mg CR tablet Generic drug:  oxybutynin chloride XL Your last dose was: Your next dose is:    
   
   
 Dose:  10 mg Take 10 mg by mouth daily. Refills:  0  
     
   
   
   
  
 gabapentin 100 mg capsule Commonly known as:  NEURONTIN Your last dose was: Your next dose is: Take  by mouth three (3) times daily. Refills:  0  
     
   
   
   
  
 glipiZIDE 10 mg tablet Commonly known as:  Velta Longs Your last dose was: Your next dose is:    
   
   
 Dose:  10 mg Take 10 mg by mouth two (2) times a day. Refills:  0 losartan-hydroCHLOROthiazide 100-12.5 mg per tablet Commonly known as:  HYZAAR Your last dose was: Your next dose is:    
   
   
 Dose:  1 Tab Take 1 Tab by mouth daily. Refills:  0  
     
   
   
   
  
 metFORMIN 500 mg tablet Commonly known as:  GLUCOPHAGE Your last dose was: Your next dose is: Take  by mouth two (2) times daily (with meals). Refills:  0  
     
   
   
   
  
 multivitamin with iron tablet Your last dose was: Your next dose is:    
   
   
 Dose:  1 Tab Take 1 Tab by mouth daily. Quantity:  30 Each Refills:  5 NOVOTWIST 32 gauge x 1/5\" Ndle Generic drug:  pen needle, diabetic Your last dose was: Your next dose is:    
   
   
  Refills:  0 Omeprazole delayed release 20 mg tablet Commonly known as:  PRILOSEC D/R Your last dose was: Your next dose is:    
   
   
 Dose:  20 mg Take 20 mg by mouth daily. Refills:  0  
     
   
   
   
  
 oxyCODONE-acetaminophen 5-325 mg per tablet Commonly known as:  PERCOCET Your last dose was: Your next dose is:    
   
   
 One-half to one po every day-bid prn pain Quantity:  45 Tab Refills:  0  
     
   
   
   
  
 TRAVATAN Z 0.004 % ophthalmic solution Generic drug:  travoprost  
   
Your last dose was: Your next dose is:    
   
   
 Dose:  1 Drop Administer 1 Drop to both eyes every evening. Refills:  0  
     
   
   
   
  
 URIBEL 118-10-40.8-36 mg Cap capsule Generic drug:  Mth-Me Blue-Sod Phos-PhSal-Hyo Your last dose was: Your next dose is:    
   
   
 Dose:  1 Cap Take 1 Cap by mouth four (4) times daily. Refills:  0  
     
   
   
   
  
 VICTOZA 0.6 mg/0.1 mL (18 mg/3 mL) Pnij Generic drug:  Liraglutide Your last dose was: Your next dose is:    
   
   
 Dose:  0.6 mg  
0.6 mg by SubCUTAneous route. Refills:  0 STOP taking these medications ADVIL 200 mg tablet Generic drug:  ibuprofen HYDROCHLOROTHIAZIDE PO  
   
  
 lisinopril 20 mg tablet Commonly known as:  Trish Marin Where to Get Your Medications These medications were sent to  [facility] [address] Phone:  [phone]  
  ciprofloxacin HCl 500 mg tablet

## 2017-09-21 NOTE — PERIOP NOTES
TRANSFER - OUT REPORT:    Verbal report given to [facility] LPN on Shi Tamayo  being transferred to [facility] for routine post - op       Report consisted of patients Situation, Background, Assessment and   Recommendations(SBAR). Information from the following report(s) SBAR, OR Summary, Procedure Summary, Intake/Output, MAR and Recent Results was reviewed with the receiving nurse. Lines:   Peripheral IV 09/21/17 Right Wrist (Active)   Site Assessment Clean, dry, & intact 9/21/2017 11:03 AM   Phlebitis Assessment 0 9/21/2017 11:03 AM   Infiltration Assessment 0 9/21/2017 11:03 AM   Dressing Status Clean, dry, & intact 9/21/2017 11:03 AM   Dressing Type Transparent 9/21/2017 11:03 AM   Hub Color/Line Status Pink; Infusing;Patent 9/21/2017 11:03 AM   Alcohol Cap Used No 9/21/2017  7:02 AM        Opportunity for questions and clarification was provided.       Patient transported with:   [employer]

## 2017-09-21 NOTE — ANESTHESIA PREPROCEDURE EVALUATION
Anesthetic History   No history of anesthetic complications            Review of Systems / Medical History  Patient summary reviewed and pertinent labs reviewed    Pulmonary  Within defined limits                 Neuro/Psych   Within defined limits           Cardiovascular    Hypertension              Exercise tolerance: >4 METS     GI/Hepatic/Renal         Renal disease      Comments: Kidney tumor Endo/Other    Diabetes: well controlled, type 2    Arthritis and cancer     Other Findings   Comments:   Risk Factors for Postoperative nausea/vomiting:       History of postoperative nausea/vomiting? NO       Female? YES       Motion sickness? NO       Intended opioid administration for postoperative analgesia? NO      Smoking Abstinence  Current Smoker? NO  Elective Surgery? YES  Seen preoperatively by anesthesiologist or proxy prior to day of surgery? YES  Pt abstained from smoking 24 hours prior to anesthesia?  YES           Physical Exam    Airway  Mallampati: II  TM Distance: > 6 cm  Neck ROM: normal range of motion   Mouth opening: Normal     Cardiovascular  Regular rate and rhythm,  S1 and S2 normal,  no murmur, click, rub, or gallop             Dental  No notable dental hx       Pulmonary  Breath sounds clear to auscultation               Abdominal  GI exam deferred       Other Findings            Anesthetic Plan    ASA: 3  Anesthesia type: MAC          Induction: Intravenous  Anesthetic plan and risks discussed with: Patient

## 2017-09-21 NOTE — H&P
OUTPATIENT HISTORY AND PHYSICAL      Today 9/21/2017     Indication/Symptoms:   Bill Walker is a 74 y.o. female here for left interpolar RCC (papillary) microwave ablation. Bx proven. Current Meds:    Prior to Admission medications    Medication Sig Start Date End Date Taking? Authorizing Provider   ibuprofen (ADVIL) 200 mg tablet Take 200 mg by mouth as needed for Pain. Yes Historical Provider   losartan-hydrochlorothiazide (HYZAAR) 100-12.5 mg per tablet Take 1 Tab by mouth daily. Yes Historical Provider   carvedilol (COREG) 12.5 mg tablet Take  by mouth two (2) times daily (with meals). Yes Historical Provider   metFORMIN (GLUCOPHAGE) 500 mg tablet Take  by mouth two (2) times daily (with meals). Yes Historical Provider   oxybutynin chloride XL (DITROPAN XL) 10 mg CR tablet Take 10 mg by mouth daily. Historical Provider   [facility]-Me Blue-Sod Phos-PhSal-Hyo (URIBEL) 118-10-40.8-36 mg cap capsule Take 1 Cap by mouth four (4) times daily. Historical Provider   acetaminophen (TYLENOL) 325 mg tablet Take 325 mg by mouth as needed. Historical Provider   aspirin delayed-release 81 mg tablet 81 mg. Historical Provider   oxyCODONE-acetaminophen (PERCOCET) 5-325 mg per tablet One-half to one po every day-bid prn pain 12/10/15   Kathy Hall MD   gabapentin (NEURONTIN) 100 mg capsule Take  by mouth three (3) times daily. Historical Provider   NOVOTWIST 32 x 1/5 \" ndle  4/22/14   Historical Provider   multivitamin with iron tablet Take 1 Tab by mouth daily. 6/11/13   Joselyn Schultz NP   Liraglutide (VICTOZA 3-VALERIANO) 0.6 mg/0.1 mL (18 mg/3 mL) sub-q pen 0.6 mg by SubCUTAneous route. Historical Provider   Omeprazole delayed release (PRILOSEC D/R) 20 mg tablet Take 20 mg by mouth daily. Historical Provider   glipiZIDE (GLUCOTROL) 10 mg tablet Take 10 mg by mouth two (2) times a day. Historical Provider   lisinopril (PRINIVIL, ZESTRIL) 20 mg tablet Take  by mouth daily. Historical Provider   HYDROCHLOROTHIAZIDE PO Take  by mouth daily. Historical Provider   travoprost (TRAVATAN Z) 0.004 % ophthalmic solution Administer 1 Drop to both eyes every evening. Historical Provider       Allergies:    No Known Allergies    Comorbid Conditions:    Past Medical History:   Diagnosis Date    Anemia NEC     Arthritis     Diabetes ([address]     Glaucoma     Hypertension     Lumbar stenosis     MDS (myelodysplastic syndrome) (HCC)     MGUS (monoclonal gammopathy of unknown significance)           Past Surgical History:   Procedure Laterality Date    BIOPSY BONE MARROW  5/11/2010    ENDOSCOPY, COLON, DIAGNOSTIC  2005    HX BREAST LUMPECTOMY  2015    HX CARPAL TUNNEL RELEASE      Left    HX CATARACT REMOVAL Bilateral 2012    HX HYSTERECTOMY  6/1985    HX LUMBAR FUSION  3/2000    HX LUMBAR LAMINECTOMY  3/2000    NEUROLOGICAL PROCEDURE UNLISTED  2002    Spinal injection     Data:    Visit Vitals    /83 (BP 1 Location: Right arm, BP Patient Position: At rest)    Pulse 70    Temp 97.7 °F (36.5 °C)    Resp 18    Ht 5' 4\" (1.626 m)    Wt 70.9 kg (156 lb 4 oz)    SpO2 99%    BMI 26.82 kg/m2   :  Recent Labs      09/21/17   0702   PLT  178     Recent Labs      09/21/17   0702   INR  0.9   APTT  26.9       The H & P and/or progress notes and any available imaging were reviewed. The risks, indications and possible alternatives to the procedure, including doing nothing, were discussed and informed consent was obtained. Physical Exam:      Mental status:   Alert and oriented. Examination specific to the procedure proposed to be performed and any co morbid conditions:   Mallampati classification 2 ,  ASA2   Heart:   RRR. Lungs:   CTAB. No wheezes, rales or rhonchi. The patient is an appropriate candidate to undergo the planned procedure and sedation.     Devin Acuna MD

## 2017-09-21 NOTE — ANESTHESIA POSTPROCEDURE EVALUATION
Post-Anesthesia Evaluation and Assessment    Patient: George Alejandro MRN: [id number]  SSN: [id number]    YOB: 1945  Age: 74 y.o. Sex: female       Cardiovascular Function/Vital Signs  Visit Vitals    /69    Pulse 70    Temp 36.4 °C (97.6 °F)    Resp 16    Ht 5' 4\" (1.626 m)    Wt 70.9 kg (156 lb 4 oz)    SpO2 100%    BMI 26.82 kg/m2       Patient is status post MAC anesthesia for * No procedures listed *. Nausea/Vomiting: None    Postoperative hydration reviewed and adequate. Pain:  Pain Scale 1: Numeric (0 - 10) (09/21/17 1119)  Pain Intensity 1: 0 (09/21/17 1119)   Managed    Neurological Status:   Neuro (WDL): Within Defined Limits (09/21/17 1119)   At baseline    Mental Status and Level of Consciousness: Arousable    Pulmonary Status:   O2 Device: Room air (09/21/17 1138)   Adequate oxygenation and airway patent    Complications related to anesthesia: None    Post-anesthesia assessment completed.  No concerns        Signed By: Bill Pham MD     September 21, 2017

## 2017-09-21 NOTE — IP AVS SNAPSHOT
Kerrymiladarvind Vallejoon 
 
 
 [address] Patient: Shi Tamayo MRN: [id number] AAM:[birth year] You are allergic to the following No active allergies Recent Documentation Height Weight BMI OB Status Smoking Status 1.626 m 70.9 kg 26.82 kg/m2 Hysterectomy Former Smoker Emergency Contacts Name Discharge Info Relation Home Work Mobile Marline Baig DISCHARGE CAREGIVER [3] Other Relative [6] [phone] Evelyn Lob DISCHARGE CAREGIVER [3] Sister [23] [phone] About your hospitalization You were admitted on:  September 21, 2017 You last received care in the:  [facility] [address] 1982 You were discharged on:  September 22, 2017 Unit phone number:  [phone] Why you were hospitalized Your primary diagnosis was:  Not on File Your diagnoses also included:  Renal Cell Carcinoma (Hcc), Renal Cell Cancer (Hcc) Providers Seen During Your Hospitalizations Provider Role Specialty Primary office phone Madai Villarreal MD Attending Provider Radiology [phone] Jimi Mcmahon MD Attending Provider Internal Medicine [phone] Your Primary Care Physician (PCP) Primary Care Physician Office Phone Office Fax Veronica Calles [phone] Follow-up Information Follow up With Details Comments Contact Info Theron Trujillo MD Go on 10/12/2017 @ 8:15 a.m. Office will call patient with a sooner appointment as soon as it is available. [address] [place] [address] 
[phone] Madai Villarreal MD Go on 10/18/2017 @ 3:00 p.m.  [address] [facility] [address] [place] [address] 
[phone] Your Appointments Wednesday September 27, 2017 10:15 AM EDT Any with Sara Ibanez MD  
[facility] ([facility])  Vashti Little [address] 
 [address] [facility]  
[phone] Current Discharge Medication List  
  
START taking these medications Dose & Instructions Dispensing Information Comments Morning Noon Evening Bedtime  
 ciprofloxacin HCl 500 mg tablet Commonly known as:  CIPRO Your last dose was: Your next dose is:    
   
   
 Dose:  500 mg Take 1 Tab by mouth two (2) times a day for 7 days. Quantity:  14 Tab Refills:  0 CONTINUE these medications which have NOT CHANGED Dose & Instructions Dispensing Information Comments Morning Noon Evening Bedtime  
 acetaminophen 325 mg tablet Commonly known as:  TYLENOL Your last dose was: Your next dose is:    
   
   
 Dose:  325 mg Take 325 mg by mouth as needed. Refills:  0  
     
   
   
   
  
 aspirin delayed-release 81 mg tablet Your last dose was: Your next dose is:    
   
   
 Dose:  81 mg  
81 mg. Refills:  0  
     
   
   
   
  
 carvedilol 12.5 mg tablet Commonly known as:  Mónica Chavez Your last dose was: Your next dose is: Take  by mouth two (2) times daily (with meals). Refills:  0 DITROPAN XL 10 mg CR tablet Generic drug:  oxybutynin chloride XL Your last dose was: Your next dose is:    
   
   
 Dose:  10 mg Take 10 mg by mouth daily. Refills:  0  
     
   
   
   
  
 gabapentin 100 mg capsule Commonly known as:  NEURONTIN Your last dose was: Your next dose is: Take  by mouth three (3) times daily. Refills:  0  
     
   
   
   
  
 glipiZIDE 10 mg tablet Commonly known as:  Joycelyn Rubin Your last dose was: Your next dose is:    
   
   
 Dose:  10 mg Take 10 mg by mouth two (2) times a day. Refills:  0  
     
   
   
   
  
 losartan-hydroCHLOROthiazide 100-12.5 mg per tablet Commonly known as:  HYZAAR  
   
 Your last dose was: Your next dose is:    
   
   
 Dose:  1 Tab Take 1 Tab by mouth daily. Refills:  0  
     
   
   
   
  
 metFORMIN 500 mg tablet Commonly known as:  GLUCOPHAGE Your last dose was: Your next dose is: Take  by mouth two (2) times daily (with meals). Refills:  0  
     
   
   
   
  
 multivitamin with iron tablet Your last dose was: Your next dose is:    
   
   
 Dose:  1 Tab Take 1 Tab by mouth daily. Quantity:  30 Each Refills:  5 NOVOTWIST 32 gauge x 1/5\" Ndle Generic drug:  pen needle, diabetic Your last dose was: Your next dose is:    
   
   
  Refills:  0 Omeprazole delayed release 20 mg tablet Commonly known as:  PRILOSEC D/R Your last dose was: Your next dose is:    
   
   
 Dose:  20 mg Take 20 mg by mouth daily. Refills:  0  
     
   
   
   
  
 oxyCODONE-acetaminophen 5-325 mg per tablet Commonly known as:  PERCOCET Your last dose was: Your next dose is:    
   
   
 One-half to one po every day-bid prn pain Quantity:  45 Tab Refills:  0  
     
   
   
   
  
 TRAVATAN Z 0.004 % ophthalmic solution Generic drug:  travoprost  
   
Your last dose was: Your next dose is:    
   
   
 Dose:  1 Drop Administer 1 Drop to both eyes every evening. Refills:  0  
     
   
   
   
  
 URIBEL 118-10-40.8-36 mg Cap capsule Generic drug:  Mth-Me Blue-Sod Phos-PhSal-Hyo Your last dose was: Your next dose is:    
   
   
 Dose:  1 Cap Take 1 Cap by mouth four (4) times daily. Refills:  0  
     
   
   
   
  
 VICTOZA 0.6 mg/0.1 mL (18 mg/3 mL) Pnij Generic drug:  Liraglutide Your last dose was: Your next dose is:    
   
   
 Dose:  0.6 mg  
0.6 mg by SubCUTAneous route. Refills:  0 STOP taking these medications ADVIL 200 mg tablet Generic drug:  ibuprofen HYDROCHLOROTHIAZIDE PO  
   
  
 lisinopril 20 mg tablet Commonly known as:  Prabhjot Chan Where to Get Your Medications These medications were sent to [facility] [place] Phone:  [phone]  
  ciprofloxacin HCl 500 mg tablet Discharge Instructions Kidney Cancer: Care Instructions Your Care Instructions Kidney cancer is when abnormal cells grow out of control in one or both of the kidneys. Your doctor will do tests to see if the cancer is in the kidney only or has spread to other parts of the body. Then you and your doctor can decide on treatment. Surgery may be used to remove the cancer and all or part of the kidney. If you cannot have surgery, you may have radiation or treatment that cuts off the blood supply to the cancer (arterial embolization). You also may have medicine that kills cancer cells (chemotherapy). And your doctor may recommend immunotherapy, which uses the body's own immune system to treat an illness. Many people still have the use of one or both kidneys after treatment for early kidney cancer. If you do not have a working kidney after treatment, you will need to have your blood cleaned by a machine (dialysis) or have a kidney transplant. Being treated for cancer can weaken your body, and you may feel very tired. Home treatment can help you feel better. Finding out that you have cancer is scary. You may feel many emotions and may need some help coping. Seek out family, friends, and counselors for support. You also can do things at home to make yourself feel better while you go through treatment. Call the [address] [place], [place] [address]luis Raya ([phone]) or visit its website at 0317 [employer]. org for more information. Follow-up care is a key part of your treatment and safety.  Be sure to make and go to all appointments, and call your doctor if you are having problems. It's also a good idea to know your test results and keep a list of the medicines you take. How can you care for yourself at home? · Take your medicines exactly as prescribed. Call your doctor if you think you are having a problem with your medicine. You may get medicine for nausea and vomiting if you have these side effects. · Follow your doctor's instructions to relieve pain. Pain from cancer and surgery can almost always be controlled. Use pain medicine when you first notice pain, before it becomes severe. · Eat healthy food. If you do not feel like eating, try to eat food that has protein and extra calories to keep up your strength and prevent weight loss. Drink liquid meal replacements for extra calories and protein. Try to eat your main meal early. Your doctor also may recommend a special diet. · Get some physical activity every day, but do not get too tired. Keep doing the hobbies you enjoy as your energy allows. · Get enough sleep. Try using a sleep mask and earplugs at night, and keep your bedroom dark, cool, and quiet. · Do not smoke. Smoking can make kidney cancer worse. If you need help quitting, talk to your doctor about stop-smoking programs and medicines. These can increase your chances of quitting for good. · Take steps to control your stress and workload. Learn relaxation techniques. ¨ Share your feelings. Stress and tension affect our emotions. By expressing your feelings to others, you may be able to understand and cope with them. ¨ Consider joining a support group. Talking about a problem with your spouse, a good friend, or other people with similar problems is a good way to reduce tension and stress. ¨ Express yourself through art. Try writing, crafts, dance, or art to relieve stress. Some dance, writing, or art groups may be available just for people who have cancer. ¨ Be kind to your body and mind. Getting enough sleep, eating a healthy diet, and taking time to do things you enjoy can contribute to an overall feeling of balance in your life and help reduce stress. ¨ Get help if you need it. Discuss your concerns with your doctor or counselor. · If you are vomiting or have diarrhea: ¨ Drink plenty of fluids (enough so that your urine is light yellow or clear like water) to prevent dehydration. Choose water and other caffeine-free clear liquids. If you have kidney, heart, or liver disease and have to limit fluids, talk with your doctor before you increase the amount of fluids you drink. ¨ When you are able to eat, try clear soups, mild foods, and liquids until all symptoms are gone for 12 to 48 hours. Other good choices include dry toast, crackers, cooked cereal, and gelatin dessert, such as Jell-O. · If you have not already done so, prepare a list of advance directives. Advance directives are instructions to your doctor and family members about what kind of care you want if you become unable to speak or express yourself. When should you call for help? Call 911 anytime you think you may need emergency care. For example, call if: 
· You are very short of breath. · You have sudden or severe chest pain. Call your doctor now or seek immediate medical care if: 
· You have new or more blood in your urine. · You have trouble urinating or can urinate only very small amounts. · You have symptoms of a urinary infection. For example: ¨ You have blood or pus in your urine. ¨ You have pain in your back just below your rib cage. This is called flank pain. ¨ You have a fever, chills, or body aches. ¨ It hurts to urinate. ¨ You have groin or belly pain. Watch closely for changes in your health, and be sure to contact your doctor if: 
· You have nausea or vomiting. · Your pain is not controlled by medicine. · You do not get better as expected. Where can you learn more? Go to http://irma-randy.info/. Enter N160 in the search box to learn more about \"Kidney Cancer: Care Instructions. \" Current as of: 2016 Content Version: 11.3 © 7256-6201 [employer]. Care instructions adapted under license by [employer] (which disclaims liability or warranty for this information). If you have questions about a medical condition or this instruction, always ask your healthcare professional. [place] [address] any warranty or liability for your use of this information. Patient armband removed and shredded MyChart Activation Thank you for requesting access to We [employer] It. Please follow the instructions below to securely access and download your online medical record. [employer] [facility] It allows you to send messages to your doctor, view your test results, renew your prescriptions, schedule appointments, and more. How Do I Sign Up? 1. In your internet browser, go to [email or web address] 
2. Click on the First Time User? Click Here link in the Sign In box. You will be redirect to the New Member Sign Up page. 3. Enter your We [employer] It Access Code exactly as it appears below. You will not need to use this code after youve completed the sign-up process. If you do not sign up before the expiration date, you must request a new code. We [facility] It Access Code: [email or web address] Expires: 2017  4:35 PM (This is the date your We [employer] It access code will ) 4. Enter the last four digits of your Social Security Number (xxxx) and Date of Birth (mm/dd/yyyy) as indicated and click Submit. You will be taken to the next sign-up page. 5. Create a We Heart It ID. This will be your We [employer] It login ID and cannot be changed, so think of one that is secure and easy to remember. 6. Create a We Heart It password. You can change your password at any time. 7. Enter your Password Reset Question and Answer.  This can be used at a later time if you forget your password. 8. Enter your e-mail address. You will receive e-mail notification when new information is available in [address] 9. Click Sign Up. You can now view and download portions of your medical record. 10. Click the Download Summary menu link to download a portable copy of your medical information. Additional Information If you have questions, please visit the Frequently Asked Questions section of the logtrust website at [email or web address] Remember, logtrust is NOT to be used for urgent needs. For medical emergencies, dial 911. DISCHARGE SUMMARY from Nurse The following personal items are in your possession at time of discharge: 
 
Dental Appliances: None Home Medications: None Jewelry: Anuja Dubose Clothing: Pants, Shirt, Other (comment), Undergarments, Socks, Footwear (vest) Other Valuables: Velasquez Huitron PATIENT INSTRUCTIONS: 
 
 
F-face looks uneven A-arms unable to move or move unevenly S-speech slurred or non-existent T-time-call 911 as soon as signs and symptoms begin-DO NOT go Back to bed or wait to see if you get better-TIME IS BRAIN. Warning Signs of HEART ATTACK Call 911 if you have these symptoms: 
? Chest discomfort. Most heart attacks involve discomfort in the center of the chest that lasts more than a few minutes, or that goes away and comes back. It can feel like uncomfortable pressure, squeezing, fullness, or pain. ? Discomfort in other areas of the upper body. Symptoms can include pain or discomfort in one or both arms, the back, neck, jaw, or stomach. ? Shortness of breath with or without chest discomfort. ? Other signs may include breaking out in a cold sweat, nausea, or lightheadedness. Don't wait more than five minutes to call [address]! Fast action can save your life. Calling 911 is almost always the fastest way to get lifesaving treatment. Emergency Medical Services staff can begin treatment when they arrive  up to an hour sooner than if someone gets to the hospital by car. The discharge information has been reviewed with the patient. The patient verbalized understanding. Discharge medications reviewed with the patient and appropriate educational materials and side effects teaching were provided. Discharge Instructions Attachments/References CIPROFLOXACIN (BY MOUTH) (ENGLISH) Discharge Orders None Introducing [facility] SERVICES! [employer] introduces Droplr patient portal. Now you can access parts of your medical record, email your doctor's office, and request medication refills online. 1. In your internet browser, go to [email or web address] 2. Click on the First Time User? Click Here link in the Sign In box. You will see the New Member Sign Up page. 3. Enter your Droplr Access Code exactly as it appears below. You will not need to use this code after youve completed the sign-up process. If you do not sign up before the expiration date, you must request a new code. · Droplr Access Code: [email or web address] Expires: 9/27/2017  4:35 PM 
 
4. Enter the last four digits of your Social Security Number (xxxx) and Date of Birth (mm/dd/yyyy) as indicated and click Submit. You will be taken to the next sign-up page. 5. Create a Dekkot ID. This will be your Dekkot login ID and cannot be changed, so think of one that is secure and easy to remember. 6. Create a Dekkot password. You can change your password at any time. 7. Enter your Password Reset Question and Answer. This can be used at a later time if you forget your password. 8. Enter your e-mail address. You will receive e-mail notification when new information is available in [address] 9. Click Sign Up. You can now view and download portions of your medical record. 10. Click the Download Summary menu link to download a portable copy of your medical information. If you have questions, please visit the Frequently Asked Questions section of the BrightScope website. Remember, BrightScope is NOT to be used for urgent needs. For medical emergencies, dial 911. Now available from your iPhone and Android! General Information Please provide this summary of care documentation to your next provider. Patient Signature:  ____________________________________________________________ Date:  ____________________________________________________________  
  
Dorina Lam Provider Signature:  ____________________________________________________________ Date:  ____________________________________________________________ More Information Ciprofloxacin (By mouth) Ciprofloxacin (twd-lto-NZBW-a-sin) Treats infections and plague. This medicine is a quinolone antibiotic. Brand Name(s): Cipro There may be other brand names for this medicine. When This Medicine Should Not Be Used: This medicine is not right for everyone. Do not use it if you had an allergic reaction to ciprofloxacin or to similar medicines. How to Use This Medicine:  
Liquid, Tablet, Long Acting Tablet · Your doctor will tell you how much medicine to use. Do not use more than directed. Take this medicine at the same time each day. · You may take this medicine with or without food. Do not take this medicine with only a source of calcium, such as milk, yogurt, or juice that contains added calcium.  You may have foods or drinks that contain calcium as part of a larger meal. 
· Swallow the extended-release tablet whole. Do not crush, break, or chew it. · Oral liquid: Shake for at least 15 seconds just before each use. The liquid has small beads floating in it. Do not chew the beads when you drink the liquid. Measure the oral liquid medicine with a marked measuring spoon, oral syringe, or medicine cup. · Tablet: Swallow whole. Do not break, crush, or chew it. · Drink extra fluids so you will urinate more often and help prevent kidney problems. · Take all of the medicine in your prescription to clear up your infection, even if you feel better after the first few doses. · This medicine should come with a Medication Guide. Ask your pharmacist for a copy if you do not have one. · Missed dose: Take a dose as soon as you remember. If it is almost time for your next dose, wait until then and take a regular dose. Do not take extra medicine to make up for a missed dose. · Store the medicine in a closed container at room temperature, away from heat, moisture, and direct light. Throw away any leftover liquid medicine after 14 days. Drugs and Foods to Avoid: Ask your doctor or pharmacist before using any other medicine, including over-the-counter medicines, vitamins, and herbal products. · Do not use this medicine together with tizanidine. · Some foods and medicines can affect how ciprofloxacin works. Tell your doctor if you are using any of the following: ¨ Clozapine, cyclosporine, duloxetine, lidocaine, methotrexate, olanzapine, pentoxifylline, phenytoin, probenecid, ropinirole, sildenafil, theophylline ¨ Antibiotic (including azithromycin, clarithromycin, erythromycin) ¨ Blood thinner (including warfarin) ¨ Diabetes medicine (including glimepiride, glyburide) ¨ Medicine for depression or mental illness ¨ Medicine for heart rhythm problems (including amiodarone, procainamide, quinidine, sotalol) ¨ NSAID pain medicine (including aspirin, celecoxib, diclofenac, ibuprofen, naproxen) ¨ Steroid medicine (including hydrocortisone, methylprednisolone, prednisone) · Take ciprofloxacin at least 2 hours before or 6 hours after you take antacids containing aluminum or magnesium, calcium, zinc, iron, lanthanum, sevelamer, sucralfate, and didanosine. This includes vitamin/mineral supplements. · This medicine slows the digestion of caffeine, so it might affect you for longer than normal. 
Warnings While Using This Medicine: · Tell your doctor if you are pregnant or breastfeeding, or if you have kidney disease, liver disease, diabetes, heart disease, myasthenia gravis, or a history of heart rhythm problems (such as prolonged QT interval) or seizures. Tell your doctor if you have ever had tendon or joint problems, including rheumatoid arthritis, or if you have received a transplant. · This medicine may cause the following problems: 
¨ Tendinitis and tendon rupture (may happen after treatment ends) ¨ Liver damage ¨ Nerve damage in the arms or legs ¨ Heart rhythm changes ¨ Changes in blood sugar levels · This medicine may make you dizzy, drowsy, or lightheaded. Do not drive or do anything that could be dangerous until you know how this medicine affects you. · This medicine can cause diarrhea. Call your doctor if the diarrhea becomes severe, does not stop, or is bloody. Do not take any medicine to stop diarrhea until you have talked to your doctor. Diarrhea can occur 2 months or more after you stop taking this medicine. · This medicine may make your skin more sensitive to sunlight. Wear sunscreen. Do not use sunlamps or tanning beds. · Call your doctor if your symptoms do not improve or if they get worse. · Keep all medicine out of the reach of children. Never share your medicine with anyone. Possible Side Effects While Using This Medicine: Call your doctor right away if you notice any of these side effects: · Allergic reaction: Itching or hives, swelling in your face or hands, swelling or tingling in your mouth or throat, chest tightness, trouble breathing · Blistering, peeling, red skin rash · Dark-colored urine or pale stools, nausea, vomiting, loss of appetite, pain in your upper stomach, yellow skin or eyes · Diarrhea that may contain blood · Fainting, dizziness, or lightheadedness · Fast, slow, or uneven heartbeat · Numbness, tingling, weakness, or burning pain in your hands, arms, legs, or feet · Pain, stiffness, swelling, or bruises around your ankle, leg, shoulder, or other joint · Seizures, severe headache, unusual thoughts or behaviors, trouble sleeping, feeling anxious, confused, or depressed, seeing, hearing, or feeling things that are not there · Unusual bleeding, bruising, or weakness If you notice other side effects that you think are caused by this medicine, tell your doctor. Call your doctor for medical advice about side effects. You may report side effects to FDA at 4-964-FDA-5781 © 2017 [facility] [place][note 2] Information is for End User's use only and may not be sold, redistributed or otherwise used for commercial purposes. The above information is an  only. It is not intended as medical advice for individual conditions or treatments. Talk to your doctor, nurse or pharmacist before following any medical regimen to see if it is safe and effective for you.

## 2017-09-21 NOTE — PROCEDURES
RADIOLOGY POST PROCEDURE NOTE     September 21, 2017       11:45 AM     Preoperative Diagnosis:   Left RCC. Postoperative Diagnosis:  Same. :  Dr. Vladimir Hernández    Assistant:  None. Type of Anesthesia: 1% plain lidocaine and GETA    Procedure/Description:  Ct guided left interpolar RCC microwave ablation. Findings:   No bleeding. Estimated blood Loss:  Minimal    Specimen Removed:   no    Blood transfusions:  None. Implants:  None.     Complications: None    Condition: Stable    Discharge Plan:  continue present therapy    Andre Otero MD

## 2017-09-22 VITALS
RESPIRATION RATE: 22 BRPM | BODY MASS INDEX: 26.67 KG/M2 | WEIGHT: 156.25 LBS | DIASTOLIC BLOOD PRESSURE: 73 MMHG | TEMPERATURE: 97.6 F | HEIGHT: 64 IN | HEART RATE: 79 BPM | OXYGEN SATURATION: 99 % | SYSTOLIC BLOOD PRESSURE: 156 MMHG

## 2017-09-22 LAB
ANION GAP SERPL CALC-SCNC: 7 MMOL/L (ref 3–18)
BASOPHILS # BLD: 0 K/UL (ref 0–0.06)
BASOPHILS NFR BLD: 0 % (ref 0–2)
BUN SERPL-MCNC: 23 MG/DL (ref 7–18)
BUN/CREAT SERPL: 21 (ref 12–20)
CALCIUM SERPL-MCNC: 8.7 MG/DL (ref 8.5–10.1)
CHLORIDE SERPL-SCNC: 107 MMOL/L (ref 100–108)
CO2 SERPL-SCNC: 26 MMOL/L (ref 21–32)
CREAT SERPL-MCNC: 1.08 MG/DL (ref 0.6–1.3)
DIFFERENTIAL METHOD BLD: ABNORMAL
EOSINOPHIL # BLD: 0 K/UL (ref 0–0.4)
EOSINOPHIL NFR BLD: 0 % (ref 0–5)
ERYTHROCYTE [DISTWIDTH] IN BLOOD BY AUTOMATED COUNT: 14.3 % (ref 11.6–14.5)
GLUCOSE BLD STRIP.AUTO-MCNC: 173 MG/DL (ref 70–110)
GLUCOSE SERPL-MCNC: 122 MG/DL (ref 74–99)
HCT VFR BLD AUTO: 32.6 % (ref 35–45)
HGB BLD-MCNC: 10.5 G/DL (ref 12–16)
LYMPHOCYTES # BLD: 1 K/UL (ref 0.9–3.6)
LYMPHOCYTES NFR BLD: 10 % (ref 21–52)
MCH RBC QN AUTO: 25.5 PG (ref 24–34)
MCHC RBC AUTO-ENTMCNC: 32.2 G/DL (ref 31–37)
MCV RBC AUTO: 79.1 FL (ref 74–97)
MONOCYTES # BLD: 0.7 K/UL (ref 0.05–1.2)
MONOCYTES NFR BLD: 7 % (ref 3–10)
NEUTS SEG # BLD: 7.8 K/UL (ref 1.8–8)
NEUTS SEG NFR BLD: 83 % (ref 40–73)
PLATELET # BLD AUTO: 162 K/UL (ref 135–420)
PMV BLD AUTO: 10.9 FL (ref 9.2–11.8)
POTASSIUM SERPL-SCNC: 4.4 MMOL/L (ref 3.5–5.5)
RBC # BLD AUTO: 4.12 M/UL (ref 4.2–5.3)
SODIUM SERPL-SCNC: 140 MMOL/L (ref 136–145)
WBC # BLD AUTO: 9.4 K/UL (ref 4.6–13.2)

## 2017-09-22 PROCEDURE — 85025 COMPLETE CBC W/AUTO DIFF WBC: CPT | Performed by: RADIOLOGY

## 2017-09-22 PROCEDURE — 74011250636 HC RX REV CODE- 250/636: Performed by: RADIOLOGY

## 2017-09-22 PROCEDURE — 80048 BASIC METABOLIC PNL TOTAL CA: CPT | Performed by: RADIOLOGY

## 2017-09-22 PROCEDURE — 36415 COLL VENOUS BLD VENIPUNCTURE: CPT | Performed by: RADIOLOGY

## 2017-09-22 PROCEDURE — 74011250636 HC RX REV CODE- 250/636: Performed by: INTERNAL MEDICINE

## 2017-09-22 PROCEDURE — 99218 HC RM OBSERVATION: CPT

## 2017-09-22 PROCEDURE — 82962 GLUCOSE BLOOD TEST: CPT

## 2017-09-22 RX ORDER — CIPROFLOXACIN 500 MG/1
500 TABLET ORAL 2 TIMES DAILY
Qty: 14 TAB | Refills: 0 | Status: SHIPPED | OUTPATIENT
Start: 2017-09-22 | End: 2017-09-29

## 2017-09-22 RX ADMIN — CIPROFLOXACIN 400 MG: 2 INJECTION, SOLUTION INTRAVENOUS at 09:37

## 2017-09-22 RX ADMIN — HEPARIN SODIUM 5000 UNITS: 5000 INJECTION, SOLUTION INTRAVENOUS; SUBCUTANEOUS at 09:38

## 2017-09-22 NOTE — PROGRESS NOTES
Care Management Interventions  PCP Verified by CM: Yes  Mode of Transport at Discharge:  (family)  Transition of Care Consult (CM Consult): Discharge Planning  Physical Therapy Consult: No  Occupational Therapy Consult: No  Current Support Network: Lives Alone  Confirm Follow Up Transport: Family  Plan discussed with Pt/Family/Caregiver: Yes  Discharge Location  Discharge Placement: Home    Pt is a 74year old admitted for renal cell carcinoma. Pt is alert and oriented and alone in room. Pt reports that she lives alone but that her nephew often visits for a few days or a week. Pt reports that prior to admission she was independent in her ADLs and that she has a cane and rollator at home. Pt reports that her home is 1 story with 3 steps to enter. Pt plans to return home on discharge and has transportation home with family. Pt declines home health. MORRIS and OBS forms signed. Originals placed in chart and copies given to pt.

## 2017-09-22 NOTE — H&P
Hospitalist Admission Note    NAME: Alicia Mckeon   :  1945   MRN:  [id number]     Date/Time of admission:  2017 8:45 PM    Patient PCP: Rahel Putnam MD  ________________________________________________________________________    My assessment of this patient's clinical condition and my plan of care is as follows. Assessment / Plan:  1. Renal cell CA pod#0 s/p ablation on right  2. H/o breast ca  3. Osteoarthritis  4. Myelodysplastic syndrome    1. Continue empiric abx per IR orders  2. Pain control, currently controlled  3. F/u labs already ordered  4. Cell line wnl  5. Follow overnight and likely d/c in am, per discussion with Dr. Darleen Beauchamp Status: full  Surrogate Decision Maker: patient    DVT Prophylaxis: sc hep if ok with IR  GI Prophylaxis: not indicated          Subjective:   CHIEF COMPLAINT: RCC    HISTORY OF PRESENT ILLNESS:     Alicia Mckeon is a 74 y.o.  female who presents with renal cell carcinoma who was electively scheduled for an ablation. Procedure went well and pt tolerated post op evaluation. She is being monitored in the hospital overnight and is tolerating po. No acute complaints at this time. Case discussed with Dr. Key Dennis. We were asked to admit for medical management of the above problems.      Past Medical History:   Diagnosis Date    Anemia NEC     Arthritis     Diabetes ([address]     Glaucoma     Hypertension     Lumbar stenosis     MDS (myelodysplastic syndrome) (HCC)     MGUS (monoclonal gammopathy of unknown significance)         Past Surgical History:   Procedure Laterality Date    BIOPSY BONE MARROW  2010    ENDOSCOPY, COLON, DIAGNOSTIC      HX BREAST LUMPECTOMY      HX CARPAL TUNNEL RELEASE      Left    HX CATARACT REMOVAL Bilateral 2012    HX HYSTERECTOMY  1985    HX LUMBAR FUSION  3/2000    HX LUMBAR LAMINECTOMY  3/2000    NEUROLOGICAL PROCEDURE UNLISTED      Spinal injection       Social History Substance Use Topics    Smoking status: Former Smoker     Packs/day: 0.50     Quit date: 12/26/1994    Smokeless tobacco: Never Used    Alcohol use No        Family History   Problem Relation Age of Onset    Cancer Mother      Colon cancer    Heart Attack Mother     Asthma Father     Kidney Disease Father      Renal failure    Cancer Sister      Colon cancer     No Known Allergies     Prior to Admission medications    Medication Sig Start Date End Date Taking? Authorizing Provider   ibuprofen (ADVIL) 200 mg tablet Take 200 mg by mouth as needed for Pain. Yes Historical Provider   losartan-hydrochlorothiazide (HYZAAR) 100-12.5 mg per tablet Take 1 Tab by mouth daily. Yes Historical Provider   carvedilol (COREG) 12.5 mg tablet Take  by mouth two (2) times daily (with meals). Yes Historical Provider   metFORMIN (GLUCOPHAGE) 500 mg tablet Take  by mouth two (2) times daily (with meals). Yes Historical Provider   oxybutynin chloride XL (DITROPAN XL) 10 mg CR tablet Take 10 mg by mouth daily. Historical Provider   [facility]-Sod Phos-PhSal-Hyo (URIBEL) 118-10-40.8-36 mg cap capsule Take 1 Cap by mouth four (4) times daily. Historical Provider   acetaminophen (TYLENOL) 325 mg tablet Take 325 mg by mouth as needed. Historical Provider   aspirin delayed-release 81 mg tablet 81 mg. Historical Provider   oxyCODONE-acetaminophen (PERCOCET) 5-325 mg per tablet One-half to one po every day-bid prn pain 12/10/15   [facility] Vickey , MD   gabapentin (NEURONTIN) 100 mg capsule Take  by mouth three (3) times daily. Historical Provider   NOVOTWIST 32 x 1/5 \" ndle  4/22/14   Historical Provider   multivitamin with iron tablet Take 1 Tab by mouth daily. 6/11/13   Eun Amador NP   Liraglutide (VICTOZA 3-VALERIANO) 0.6 mg/0.1 mL (18 mg/3 mL) sub-q pen 0.6 mg by SubCUTAneous route. Historical Provider   Omeprazole delayed release (PRILOSEC D/R) 20 mg tablet Take 20 mg by mouth daily.     Historical Provider   glipiZIDE (GLUCOTROL) 10 mg tablet Take 10 mg by mouth two (2) times a day. Historical Provider   lisinopril (PRINIVIL, ZESTRIL) 20 mg tablet Take  by mouth daily. Historical Provider   HYDROCHLOROTHIAZIDE PO Take  by mouth daily. Historical Provider   travoprost (TRAVATAN Z) 0.004 % ophthalmic solution Administer 1 Drop to both eyes every evening. Historical Provider       REVIEW OF SYSTEMS:     I am not able to complete the review of systems because:    The patient is intubated and sedated    The patient has altered mental status due to his acute medical problems    The patient has baseline aphasia from prior stroke(s)    The patient has baseline dementia and is not reliable historian    The patient is in acute medical distress and unable to provide information           Total of 12 systems reviewed as follows:       POSITIVE= bolded text  Negative = text not underlined  General:  fever, chills, sweats, generalized weakness, weight loss/gain,      loss of appetite   Eyes:    blurred vision, eye pain, loss of vision, double vision  ENT:    rhinorrhea, pharyngitis   Respiratory:   cough, sputum production, SOB, KATZ, wheezing, pleuritic pain   Cardiology:   chest pain, palpitations, orthopnea, PND, edema, syncope   Gastrointestinal:  abdominal pain , N/V, diarrhea, dysphagia, constipation, bleeding   Genitourinary:  frequency, urgency, dysuria, hematuria, incontinence   Muskuloskeletal :  arthralgia, myalgia, back pain  Hematology:  easy bruising, nose or gum bleeding, lymphadenopathy   Dermatological: rash, ulceration, pruritis, color change / jaundice  Endocrine:   hot flashes or polydipsia   Neurological:  headache, dizziness, confusion, focal weakness, paresthesia,     Speech difficulties, memory loss, gait difficulty  Psychological: Feelings of anxiety, depression, agitation    Objective:   VITALS:    Visit Vitals    /85 (BP 1 Location: Left arm, BP Patient Position: At rest)  Pulse (!) 107    Temp 97.2 °F (36.2 °C)    Resp 19    Ht 5' 4\" (1.626 m)    Wt 70.9 kg (156 lb 4 oz)    SpO2 98%    BMI 26.82 kg/m2       PHYSICAL EXAM:    General:    Alert, cooperative, no distress, appears stated age. HEENT: Atraumatic, anicteric sclerae, pink conjunctivae     No oral ulcers, mucosa moist, throat clear, dentition fair  Neck:  Supple, symmetrical,  thyroid: non tender  Lungs:   Clear to auscultation bilaterally. No Wheezing or Rhonchi. No rales. Chest wall:  No tenderness  No Accessory muscle use. Heart:   Regular  rhythm,  No  murmur   No edema  Abdomen:   Soft, non-tender. Not distended. Bowel sounds normal  Extremities: No cyanosis. No clubbing,      Skin turgor normal, Capillary refill normal, Radial dial pulse 2+  Skin:     Not pale. Not Jaundiced  No rashes   Psych:  Good insight. Not depressed. Not anxious or agitated. Neurologic: EOMs intact. No facial asymmetry. No aphasia or slurred speech. Symmetrical strength, Sensation grossly intact.  Alert and oriented X 4.     _______________________________________________________________________  Care Plan discussed with:    Comments   Patient x    Family      RN     Care Manager                    Consultant:      _______________________________________________________________________  Expected  Disposition:   Home with Family x   HH/PT/OT/RN    SNF/LTC    YOLANDA    ________________________________________________________________________  TOTAL TIME:  28 Minutes    Critical Care Provided     Minutes non procedure based      Comments    x Reviewed previous records   >50% of visit spent in counseling and coordination of care x Discussion with patient and/or family and questions answered       ________________________________________________________________________      Procedures: see electronic medical records for all procedures/Xrays and details which were not copied into this note but were reviewed prior to creation of Plan.    LAB DATA REVIEWED:    Recent Results (from the past 24 hour(s))   GLUCOSE, POC    Collection Time: 09/21/17  6:58 AM   Result Value Ref Range    Glucose (POC) 120 (H) 70 - 954 mg/dL   METABOLIC PANEL, BASIC    Collection Time: 09/21/17  7:02 AM   Result Value Ref Range    Sodium 139 136 - 145 mmol/L    Potassium 5.0 3.5 - 5.5 mmol/L    Chloride 106 100 - 108 mmol/L    CO2 26 21 - 32 mmol/L    Anion gap 7 3.0 - 18 mmol/L    Glucose 110 (H) 74 - 99 mg/dL    BUN 21 (H) 7.0 - 18 MG/DL    Creatinine 1.04 0.6 - 1.3 MG/DL    BUN/Creatinine ratio 20 12 - 20      GFR est AA >60 >60 ml/min/1.73m2    GFR est non-AA 52 (L) >60 ml/min/1.73m2    Calcium 9.2 8.5 - 10.1 MG/DL   CBC W/O DIFF    Collection Time: 09/21/17  7:02 AM   Result Value Ref Range    WBC 4.6 4.6 - 13.2 K/uL    RBC 4.35 4.20 - 5.30 M/uL    HGB 11.1 (L) 12.0 - 16.0 g/dL    HCT 34.6 (L) 35.0 - 45.0 %    MCV 79.5 74.0 - 97.0 FL    MCH 25.5 24.0 - 34.0 PG    MCHC 32.1 31.0 - 37.0 g/dL    RDW 14.5 11.6 - 14.5 %    PLATELET 425 336 - 812 K/uL    MPV 11.2 9.2 - 11.8 FL   PROTHROMBIN TIME + INR    Collection Time: 09/21/17  7:02 AM   Result Value Ref Range    Prothrombin time 11.9 11.5 - 15.2 sec    INR 0.9 0.8 - 1.2     PTT    Collection Time: 09/21/17  7:02 AM   Result Value Ref Range    aPTT 26.9 23.0 - 36.4 SEC   GLUCOSE, POC    Collection Time: 09/21/17 12:04 PM   Result Value Ref Range    Glucose (POC) 103 70 - 110 mg/dL   GLUCOSE, POC    Collection Time: 09/21/17  8:42 PM   Result Value Ref Range    Glucose (POC) 190 (H) 70 - 110 mg/dL       Patty Janus, MD  Internal Medicine  Hospitalist Division

## 2017-09-22 NOTE — DISCHARGE INSTRUCTIONS
Kidney Cancer: Care Instructions  Your Care Instructions  Kidney cancer is when abnormal cells grow out of control in one or both of the kidneys. Your doctor will do tests to see if the cancer is in the kidney only or has spread to other parts of the body. Then you and your doctor can decide on treatment. Surgery may be used to remove the cancer and all or part of the kidney. If you cannot have surgery, you may have radiation or treatment that cuts off the blood supply to the cancer (arterial embolization). You also may have medicine that kills cancer cells (chemotherapy). And your doctor may recommend immunotherapy, which uses the body's own immune system to treat an illness. Many people still have the use of one or both kidneys after treatment for early kidney cancer. If you do not have a working kidney after treatment, you will need to have your blood cleaned by a machine (dialysis) or have a kidney transplant. Being treated for cancer can weaken your body, and you may feel very tired. Home treatment can help you feel better. Finding out that you have cancer is scary. You may feel many emotions and may need some help coping. Seek out family, friends, and counselors for support. You also can do things at home to make yourself feel better while you go through treatment. Call the [facility] (1-999.395.1710) or visit its website at [address] [employer]am for more information. Follow-up care is a key part of your treatment and safety. Be sure to make and go to all appointments, and call your doctor if you are having problems. It's also a good idea to know your test results and keep a list of the medicines you take. How can you care for yourself at home? · Take your medicines exactly as prescribed. Call your doctor if you think you are having a problem with your medicine. You may get medicine for nausea and vomiting if you have these side effects. · Follow your doctor's instructions to relieve pain. Pain from cancer and surgery can almost always be controlled. Use pain medicine when you first notice pain, before it becomes severe. · Eat healthy food. If you do not feel like eating, try to eat food that has protein and extra calories to keep up your strength and prevent weight loss. Drink liquid meal replacements for extra calories and protein. Try to eat your main meal early. Your doctor also may recommend a special diet. · Get some physical activity every day, but do not get too tired. Keep doing the hobbies you enjoy as your energy allows. · Get enough sleep. Try using a sleep mask and earplugs at night, and keep your bedroom dark, cool, and quiet. · Do not smoke. Smoking can make kidney cancer worse. If you need help quitting, talk to your doctor about stop-smoking programs and medicines. These can increase your chances of quitting for good. · Take steps to control your stress and workload. Learn relaxation techniques. ¨ Share your feelings. Stress and tension affect our emotions. By expressing your feelings to others, you may be able to understand and cope with them. ¨ Consider joining a support group. Talking about a problem with your spouse, a good friend, or other people with similar problems is a good way to reduce tension and stress. ¨ Express yourself through art. Try writing, crafts, dance, or art to relieve stress. Some dance, writing, or art groups may be available just for people who have cancer. ¨ Be kind to your body and mind. Getting enough sleep, eating a healthy diet, and taking time to do things you enjoy can contribute to an overall feeling of balance in your life and help reduce stress. ¨ Get help if you need it. Discuss your concerns with your doctor or counselor. · If you are vomiting or have diarrhea:  ¨ Drink plenty of fluids (enough so that your urine is light yellow or clear like water) to prevent dehydration. Choose water and other caffeine-free clear liquids.  If you have kidney, heart, or liver disease and have to limit fluids, talk with your doctor before you increase the amount of fluids you drink. ¨ When you are able to eat, try clear soups, mild foods, and liquids until all symptoms are gone for 12 to 48 hours. Other good choices include dry toast, crackers, cooked cereal, and gelatin dessert, such as Jell-O.  · If you have not already done so, prepare a list of advance directives. Advance directives are instructions to your doctor and family members about what kind of care you want if you become unable to speak or express yourself. When should you call for help? Call 911 anytime you think you may need emergency care. For example, call if:  · You are very short of breath. · You have sudden or severe chest pain. Call your doctor now or seek immediate medical care if:  · You have new or more blood in your urine. · You have trouble urinating or can urinate only very small amounts. · You have symptoms of a urinary infection. For example:  ¨ You have blood or pus in your urine. ¨ You have pain in your back just below your rib cage. This is called flank pain. ¨ You have a fever, chills, or body aches. ¨ It hurts to urinate. ¨ You have groin or belly pain. Watch closely for changes in your health, and be sure to contact your doctor if:  · You have nausea or vomiting. · Your pain is not controlled by medicine. · You do not get better as expected. Where can you learn more? Go to http://irma-randy.info/. Enter N160 in the search box to learn more about \"Kidney Cancer: Care Instructions. \"  Current as of: October 24, 2016  Content Version: 11.3  © 7677-7384 [employer] Care instructions adapted under license by [employer] (which disclaims liability or warranty for this information).  If you have questions about a medical condition or this instruction, always ask your healthcare professional. [place] [address] any warranty or liability for your use of this information. Patient armband removed and shredded  MyChart Activation    Thank you for requesting access to noodls. Please follow the instructions below to securely access and download your online medical record. noodls allows you to send messages to your doctor, view your test results, renew your prescriptions, schedule appointments, and more. How Do I Sign Up? 1. In your internet browser, go to [email or web address]  2. Click on the First Time User? Click Here link in the Sign In box. You will be redirect to the New Member Sign Up page. 3. Enter your noodls Access Code exactly as it appears below. You will not need to use this code after youve completed the sign-up process. If you do not sign up before the expiration date, you must request a new code. noodls Access Code: [email or web address]  Expires: 2017  4:35 PM (This is the date your noodls access code will )    4. Enter the last four digits of your Social Security Number (xxxx) and Date of Birth (mm/dd/yyyy) as indicated and click Submit. You will be taken to the next sign-up page. 5. Create a noodls ID. This will be your noodls login ID and cannot be changed, so think of one that is secure and easy to remember. 6. Create a noodls password. You can change your password at any time. 7. Enter your Password Reset Question and Answer. This can be used at a later time if you forget your password. 8. Enter your e-mail address. You will receive e-mail notification when new information is available in [address] 9. Click Sign Up. You can now view and download portions of your medical record. 10. Click the Download Summary menu link to download a portable copy of your medical information. Additional Information    If you have questions, please visit the Frequently Asked Questions section of the noodls website at [email or web address] Remember, noodls is NOT to be used for urgent needs. For medical emergencies, dial 911. DISCHARGE SUMMARY from Nurse    The following personal items are in your possession at time of discharge:    Dental Appliances: None        Home Medications: None  Jewelry: Earrings, Ring  Clothing: Pants, Shirt, Other (comment), Undergarments, Socks, Footwear (vest)  Other Valuables: Eyeglasses             PATIENT INSTRUCTIONS:    After general anesthesia or intravenous sedation, for 24 hours or while taking prescription Narcotics:  · Limit your activities  · Do not drive and operate hazardous machinery  · Do not make important personal or business decisions  · Do  not drink alcoholic beverages  · If you have not urinated within 8 hours after discharge, please contact your surgeon on call. Report the following to your surgeon:  · Excessive pain, swelling, redness or odor of or around the surgical area  · Temperature over 100.5  · Nausea and vomiting lasting longer than 4 hours or if unable to take medications  · Any signs of decreased circulation or nerve impairment to extremity: change in color, persistent  numbness, tingling, coldness or increase pain  · Any questions        What to do at Home:  Recommended activity: Activity as tolerated    If you experience any of the following symptoms flank pain, abdominal pain, back pain, nausea, vomiting, diarrhea, short of breath, chest pain, fever greater than 100.5 please follow up with PCP. *  Please give a list of your current medications to your Primary Care Provider. *  Please update this list whenever your medications are discontinued, doses are      changed, or new medications (including over-the-counter products) are added. *  Please carry medication information at all times in case of emergency situations.           These are general instructions for a healthy lifestyle:    No smoking/ No tobacco products/ Avoid exposure to second hand smoke    Surgeon General's Warning:  Quitting smoking now greatly reduces serious risk to your health. Obesity, smoking, and sedentary lifestyle greatly increases your risk for illness    A healthy diet, regular physical exercise & weight monitoring are important for maintaining a healthy lifestyle    You may be retaining fluid if you have a history of heart failure or if you experience any of the following symptoms:  Weight gain of 3 pounds or more overnight or 5 pounds in a week, increased swelling in our hands or feet or shortness of breath while lying flat in bed. Please call your doctor as soon as you notice any of these symptoms; do not wait until your next office visit. Recognize signs and symptoms of STROKE:    F-face looks uneven    A-arms unable to move or move unevenly    S-speech slurred or non-existent    T-time-call 911 as soon as signs and symptoms begin-DO NOT go       Back to bed or wait to see if you get better-TIME IS BRAIN. Warning Signs of HEART ATTACK     Call 911 if you have these symptoms:   Chest discomfort. Most heart attacks involve discomfort in the center of the chest that lasts more than a few minutes, or that goes away and comes back. It can feel like uncomfortable pressure, squeezing, fullness, or pain.  Discomfort in other areas of the upper body. Symptoms can include pain or discomfort in one or both arms, the back, neck, jaw, or stomach.  Shortness of breath with or without chest discomfort.  Other signs may include breaking out in a cold sweat, nausea, or lightheadedness. Don't wait more than five minutes to call 911 - MINUTES MATTER! Fast action can save your life. Calling 911 is almost always the fastest way to get lifesaving treatment. Emergency Medical Services staff can begin treatment when they arrive -- up to an hour sooner than if someone gets to the hospital by car. The discharge information has been reviewed with the patient. The patient verbalized understanding.     Discharge medications reviewed with the patient and appropriate educational materials and side effects teaching were provided.

## 2017-09-22 NOTE — DISCHARGE SUMMARY
[address]  [address] [place] Vicky Bustamante SUMMARY    Name:  Tatiana Valerio  MR#:  [id number]  :  1945  Account #:  [de-identified]  Date of Adm:  2017  Date of Discharge:  2017      DISCHARGE DIAGNOSES  1. Renal cell carcinoma, status post CT-guided microwave ablation  performed on 2017.  2. Osteoarthritis. 3. Myelodysplastic syndrome. 4. History of breast cancer. 5. Hypertension. SERVICE: The patient was admitted to the hospitalist service. CONSULTS: Dr. Maldonado Self was following for Interventional Radiology. PROCEDURES: As per above. HISTORY OF PRESENT ILLNESS: The patient is a 70-year-old  female with a history significant for the above, who is here for  microablation. We are asked to follow the patient overnight and  monitor. She had no complaints when we saw her. Tolerating p.o. At the time of evaluation, vital signs are stable and normal with a  pressure 144/85 and a pulse of 107. She appeared to be in no  distress, had an otherwise regular heart, clear lungs, benign abdomen. No edema. LABORATORY DATA: Included a normal metabolic panel, normal  CBC with an hemoglobin and hematocrit of 11.1/34.5. HOSPITAL COURSE BY PROBLEM  1. Renal cell carcinoma, status post microwave ablation: Recovered  well overnight. On IV Cipro. Will be going home with a 1 week worth of  Cipro. She reports that she does have her pain medications as listed  below. No other complaints. Denies any pain. Ambulates without any  difficulty. Followup otherwise with Dr. Cholo Wright as scheduled. Follow  with Interventional Radiology in 3-4 weeks. 2. Hypertension: By history. Her discharge medication list has multiple  entries, she reports that she takes only the combination  Losartan/hydrochlorothiazide tablet. She can resume this on  discharge. 3. She has a history of myelodysplastic syndrome and osteoarthritis. No acute issues regarding these diagnoses.  Cell count stable, today a  CBC with an hemoglobin and hematocrit of 10.5/32.6. On examination today, vital signs are stable and normal. The patient  denies any fevers or chills, nausea, vomiting, chest pain, shortness of  breath, palpitations, or edema. No abdominal pain. Ambulates without  any difficulty. She has a regular heart, clear lungs, benign abdomen. No calf  tenderness or edema. Left flank procedure site is dressed, dressing is  clean, dry and intact. DISPOSITION: The patient will be discharged home. MEDICATION ON DISCHARGE  NEW MEDICINES AS FOLLOWS  1. Cipro 500 mg p.o. b.i.d. for 7 days. OTHERWISE, RESUME THE FOLLOWING  1. Tylenol 325 mg p.o. every 4 to 6 hours as needed for pain or fever. 2. Aspirin 81 mg p.o. daily. 3. Coreg 12.5 mg p.o. b.i.d.  4. Ditropan XL 10 mg p.o. daily. 5. Neurontin 100 mg p.o. t.i.d.  6. Glipizide 10 mg p.o. b.i.d.  7. Hyzaar 100/12.5 mg p.o. daily. 8. Metformin 500 mg p.o. b.i.d.  9. Multivitamin p.o. daily. 10. Prilosec 20 mg p.o. daily. 11. Percocet 5/325 mg half tablet to 1 tablet p.o. b.i.d. p.r.n. pain. 12. Travatan Z 0.004% ophthalmic solution 1 drop both eyes every  evening. 13. Uribel 1 cap p.o. q.i.d. 14. Victoza 0.6 mg subcutaneously. I discontinued Advil 200 mg, I have removed her old HCTZ, Lisinopril  prescription entries, as she is not taking these. FOLLOWUP: With her PCP, Dr. April Echeverria, in 7-10 days. With  Interventional Radiology in 3-4 weeks, with Dr. Travis Crowley as scheduled.         Amber Luther MD    PP / DC  D:  09/22/2017   11:07  T:  09/22/2017   14:28  Job #:  [id number]

## 2017-09-22 NOTE — PROGRESS NOTES
Denies procedural pain  C.o chronic back pain  Denies N/V  Afebrile  Dressing, dry clean and intact  Voiding via urinal  Ambulatory w/assit  ICS in place

## 2017-10-17 ENCOUNTER — HOSPITAL ENCOUNTER (OUTPATIENT)
Dept: CT IMAGING | Age: 72
Discharge: HOME | End: 2017-10-17
Attending: RADIOLOGY
Payer: MEDICARE

## 2017-10-17 DIAGNOSIS — C64.1 RENAL CARCINOMA, RIGHT (HCC): ICD-10-CM

## 2017-10-17 PROCEDURE — 74170 CT ABD WO CNTRST FLWD CNTRST: CPT

## 2017-10-17 PROCEDURE — 74011636320 HC RX REV CODE- 636/320: Performed by: RADIOLOGY

## 2017-10-17 RX ADMIN — IOPAMIDOL 90 ML: 612 INJECTION, SOLUTION INTRAVENOUS at 14:00

## 2018-01-12 ENCOUNTER — HOSPITAL ENCOUNTER (OUTPATIENT)
Dept: CT IMAGING | Age: 73
Discharge: HOME | End: 2018-01-12
Attending: RADIOLOGY
Payer: MEDICARE

## 2018-01-12 DIAGNOSIS — C64.1 RENAL CARCINOMA, RIGHT (HCC): ICD-10-CM

## 2018-01-12 LAB — CREAT UR-MCNC: 1 MG/DL (ref 0.6–1.3)

## 2018-01-12 PROCEDURE — 82565 ASSAY OF CREATININE: CPT

## 2018-01-12 PROCEDURE — 74011636320 HC RX REV CODE- 636/320: Performed by: RADIOLOGY

## 2018-01-12 PROCEDURE — 74178 CT ABD&PLV WO CNTR FLWD CNTR: CPT

## 2018-01-12 RX ADMIN — IOPAMIDOL 100 ML: 612 INJECTION, SOLUTION INTRAVENOUS at 10:00

## 2018-11-04 NOTE — PROGRESS NOTES
conducted an initial consultation and Spiritual Assessment for Adiel Thao, who is a 74 y.o.,female. Patients Primary Language is: [place]. According to the patients EMR Orthodox Affiliation is: [facility].     The reason the Patient came to the hospital is:   Patient Active Problem List    Diagnosis Date Noted    Renal cell carcinoma ([facility] 09/21/2017    Renal cell cancer ([facility] 09/21/2017    Hx of breast cancer 06/29/2017    Sacroiliac joint dysfunction of both sides 12/10/2015    Hx of spinal fusion 12/10/2015    Malignant neoplasm of right breast ([facility] [facility] 12/10/2015    Osteoarthritis of back 02/23/2015    Anemia associated with myelodysplastic syndrome treated with erythropoietin ([facility] 10/27/2014    MDS (myelodysplastic syndrome) (HCC)     MGUS (monoclonal gammopathy of unknown significance)         The  provided the following Interventions:  Initiated a relationship of care and support. Explored issues of marcia, belief, spirituality and Christian/ritual needs while hospitalized. Listened empathically. Provided information about Spiritual Care Services. Chart reviewed. The following outcomes where achieved:   confirmed Patient's Orthodox Affiliation. Patient expressed gratitude for 's visit. Assessment:  Patient does not have any Christian/cultural needs that will affect patients preferences in health care. There are no spiritual or Christian issues which require intervention at this time. Plan:  Chaplains will continue to follow and will provide pastoral care on an as needed/requested basis.  recommends bedside caregivers page  on duty if patient shows signs of acute spiritual or emotional distress.     [address]  [phone] none

## 2018-12-28 ENCOUNTER — HOSPITAL ENCOUNTER (OUTPATIENT)
Dept: CT IMAGING | Age: 73
Discharge: HOME | End: 2018-12-28
Attending: RADIOLOGY
Payer: MEDICARE

## 2018-12-28 DIAGNOSIS — C64.1 MALIGNANT NEOPLASM OF RIGHT KIDNEY, EXCEPT RENAL PELVIS (HCC): ICD-10-CM

## 2018-12-28 LAB — CREAT UR-MCNC: 1 MG/DL (ref 0.6–1.3)

## 2018-12-28 PROCEDURE — 74011636320 HC RX REV CODE- 636/320: Performed by: RADIOLOGY

## 2018-12-28 PROCEDURE — 74170 CT ABD WO CNTRST FLWD CNTRST: CPT

## 2018-12-28 PROCEDURE — 82565 ASSAY OF CREATININE: CPT

## 2018-12-28 RX ADMIN — IOPAMIDOL 100 ML: 755 INJECTION, SOLUTION INTRAVENOUS at 10:09

## 2019-02-20 ENCOUNTER — OFFICE VISIT (OUTPATIENT)
Dept: ORTHOPEDIC SURGERY | Age: 74
End: 2019-02-20

## 2019-02-20 VITALS
OXYGEN SATURATION: 99 % | DIASTOLIC BLOOD PRESSURE: 87 MMHG | BODY MASS INDEX: 27.49 KG/M2 | HEART RATE: 89 BPM | TEMPERATURE: 98 F | SYSTOLIC BLOOD PRESSURE: 168 MMHG | RESPIRATION RATE: 18 BRPM | HEIGHT: 64 IN | WEIGHT: 161 LBS

## 2019-02-20 DIAGNOSIS — Z98.1 HX OF SPINAL FUSION: ICD-10-CM

## 2019-02-20 DIAGNOSIS — M51.36 DDD (DEGENERATIVE DISC DISEASE), LUMBAR: Primary | ICD-10-CM

## 2019-02-20 DIAGNOSIS — M54.50 LUMBAR SPINE PAIN: ICD-10-CM

## 2019-02-20 NOTE — PROGRESS NOTES
[address] 
[address], [address] Phone: [phone] Fax: [phone] Tyrese Tinsley : 1945 PCP: Ruthann Moseley MD 
 
 
NEW PATIENT 
 
 
ASSESSMENT AND PLAN Diagnoses and all orders for this visit: 1. DDD (degenerative disc disease), lumbar 
-     REFERRAL TO PHYSICAL THERAPY 2. Lumbar spine pain -     AMB POC XRAY, SPINE, LUMBOSACRAL; 4+ 
 
3. Hx of spinal fusion, L3 to sacrum 1. 73yo presenting with increased symptoms, likely junctional changes and SIJ mediated pain. Advised to stay active as tolerated. Discussed Silver Sneakers. 2. Discussed using back brace for support for prolonged walking/standing-PRN only 3. [facility]erral to [facility] 
4. Discussed RFA, PT, medications, lifestyle modifications as treatment options. She does not want further sx. 
5. Given information on DDD Follow-up Disposition: 
Return in about 6 weeks (around 4/3/2019). CHIEF COMPLAINT Kane Newby is seen today in consultation at the request of Dr. Severa Cash for complaints of back pain. HISTORY OF PRESENT ILLNESS Kane Newby is a 66 y.o. female. Today pt c/o back pain since 2000. Pt denies any specific incident or injury that caused their pain. Pt reports R shoulder issues. She c/o short standing tolerance. + shopping cart. She notes her low back is not sore, but more stiff. She reports relief with sitting. Pt thinks she has a back brace at home she can use. Location of pain: low back Does pain radiate into extremities: BLE numbness with standing upright Does patient have weakness: no  
Pt denies saddle paresthesias. Medications pt is on: Tylenol PRN, not much benefit. Pt denies recent ED visits or hospitalizations. Denies persistent fevers, chills, weight changes, neurogenic bowel or bladder symptoms. Treatments patient has tried: 
Physical therapy:Yes last years ago increased pain Doing HEP: Some Non-opioid medications: Yes Spinal injections: Yes Dr. Abran Hogue temporary benefit Spinal surgery- Yes. 2000 and 2002 lumbar surgery - L3 to sacrum fusion- Dr. Campbell Flatten -12-18 month benefit each Last L MRI 2012: moderate foraminal stenosis L3-4-5-S1. Spondylolisthesis L3-4  reviewed. PMHx of MDS, DM, glaucoma, L CTS, breast cancer. Pain Assessment  12/10/2015 Location of Pain Back Location Modifiers (No Data) Severity of Pain 3 Quality of Pain Aching Quality of Pain Comment stiffness Duration of Pain Persistent Frequency of Pain Constant Date Pain First Started 12/10/2014 Aggravating Factors Standing;Squatting;Stretching Aggravating Factors Comment for prolonged periods Limiting Behavior Some Relieving Factors Other (Comment) Relieving Factors Comment Medication Result of Injury No  
 
 
 
 
MRI lumbar spine 2012 Result Impression Impression: 1.  Patient status post prior L3 through S1 laminectomies with posterior fusion causing magnetic susceptibility artifact.  Grade 1 anterolisthesis of L3 on L4 by approximately 5 mm.  Dorsal soft tissue enhancing scar tissue without significant mass effect on the thecal sac or the neural foramina.  Patent spinal canal.  Multilevel facet arthropathy. 2.  Moderate right and mild-to-moderate left L3 -- 4 and L4 -- 5 level foraminal stenosis.  Moderate left L5 -- S1 level foraminal stenosis that is suboptimally evaluated due to extensive adjacent magnetic susceptibility artifact. PAST MEDICAL HISTORY Past Medical History:  
Diagnosis Date  Anemia NEC  Arthritis  Breast cancer ([address] 2015  Diabetes ([address]  Glaucoma  Hypertension  Lumbar stenosis  MDS (myelodysplastic syndrome) ([address]  MGUS (monoclonal gammopathy of unknown significance) Past Surgical History:  
Procedure Laterality Date  BIOPSY BONE MARROW  5/11/2010  ENDOSCOPY, COLON, DIAGNOSTIC  2005  HX BREAST LUMPECTOMY  2015  HX CARPAL TUNNEL RELEASE Left  HX CATARACT REMOVAL Bilateral 2012  HX HYSTERECTOMY  6/1985  HX LUMBAR FUSION  2002 Dr. Poon Erps, L3-S1  
 HX LUMBAR LAMINECTOMY  3/2000  
 NEUROLOGICAL PROCEDURE UNLISTED  2002 Spinal injection MEDICATIONS Current Outpatient Medications Medication Sig Dispense Refill  [facility]-me blue-sod phos-phsal-hyo (URIBEL) 118-10-40.8-36 mg cap capsule Take 1 Cap by mouth four (4) times daily. 30 Cap 0  
 acetaminophen (TYLENOL) 325 mg tablet Take 325 mg by mouth as needed.  aspirin delayed-release 81 mg tablet 81 mg.    
 losartan-hydrochlorothiazide (HYZAAR) 100-12.5 mg per tablet Take 1 Tab by mouth daily.  NOVOTWIST 32 x 1/5 \" ndle  multivitamin with iron tablet Take 1 Tab by mouth daily. 30 Each 5  
 Omeprazole delayed release (PRILOSEC D/R) 20 mg tablet Take 20 mg by mouth daily.  glipiZIDE (GLUCOTROL) 10 mg tablet Take 10 mg by mouth two (2) times a day.  carvedilol (COREG) 12.5 mg tablet Take  by mouth two (2) times daily (with meals).  metFORMIN (GLUCOPHAGE) 500 mg tablet Take  by mouth two (2) times daily (with meals).  travoprost (TRAVATAN Z) 0.004 % ophthalmic solution Administer 1 Drop to both eyes every evening. ALLERGIES No Known Allergies SOCIAL HISTORY Social History Socioeconomic History  Marital status:  Spouse name: Not on file  Number of children: Not on file  Years of education: Not on file  Highest education level: Not on file Social Needs  Financial resource strain: Not on file  Food insecurity - worry: Not on file  Food insecurity - inability: Not on file  Transportation needs - medical: Not on file  Transportation needs - non-medical: Not on file Occupational History  Not on file Tobacco Use  Smoking status: Former Smoker Packs/day: 0.50 Last attempt to quit: 12/26/1994 Years since quittin.1  Smokeless tobacco: Never Used Substance and Sexual Activity  Alcohol use: No  
 Drug use: No  
 Sexual activity: Not on file Other Topics Concern  Not on file Social History Narrative  Not on file FAMILY HISTORY Family History Problem Relation Age of Onset  Cancer Mother Colon cancer  Heart Attack Mother  Asthma Father  Kidney Disease Father Renal failure  Cancer Sister Colon cancer REVIEW OF SYSTEMS Review of Systems Constitutional: Negative for chills, fever and weight loss. Respiratory: Negative for shortness of breath. Cardiovascular: Negative for chest pain. Gastrointestinal: Negative for constipation. Negative for fecal incontinence Genitourinary: Negative for dysuria. Negative for urinary incontinence Musculoskeletal:  
     Per HPI Skin: Negative for rash. Neurological: Positive for tingling. Negative for dizziness, tremors, focal weakness and headaches. Endo/Heme/Allergies: Does not bruise/bleed easily. Psychiatric/Behavioral: The patient does not have insomnia. PHYSICAL EXAMINATION Visit Vitals /87 Pulse 89 Temp 98 °F (36.7 °C) Resp 18 Ht 5' 4\" (1.626 m) Wt 161 lb (73 kg) SpO2 99% BMI 27.64 kg/m² Accompanied by self. Constitutional:  Well developed, well nourished, in no acute distress. Psychiatric: Affect and mood are appropriate. Integumentary: No rashes or abrasions noted on exposed areas. Cardiovascular/Peripheral Vascular: Intact l pulses. No peripheral edema is noted BLE. Lymphatic:  No evidence of lymphedema. No cervical lymphadenopathy. SPINE/MUSCULOSKELETAL EXAM 
 
 
Lumbar spine: No rash, ecchymosis, or gross obliquity. No fasciculations. No focal atrophy is noted. Pt reports upper lumbar and sacral pain with standing at neutral. Tenderness to palpation B/L SI joints.   No tenderness to palpation at the sciatic notch. Trochanters non tender. Sensation grossly intact to light touch. MOTOR:   
 
 Hip Flex  Quads Hamstrings Ankle DF EHL Ankle PF Right +4/5 +4/5 +4/5 +4/5 +4/5 +4/5 Left +4/5 +4/5 +4/5 +4/5 +4/5 +4/5 Straight Leg raise negative. Ambulation with single point cane. FWB. RADIOGRAPHS Lumbar spine xray films reviewed: 
1) L1-2, L2-3 disc narrowing 2) no instability Written by Mario Bacon, as dictated by Alina Alvarado MD. 
 
I, Dr. Alina Alvarado MD, confirm that all documentation is accurate. Ms. Eryn Lal may have a reminder for a \"due or due soon\" health maintenance. I have asked that she contact her primary care provider for follow-up on this health maintenance.

## 2019-02-20 NOTE — PROGRESS NOTES
Verbal order entered per Dr. Bijan Winn as documented on blue sheet: 
-[facility]-DDD-Patient wants to go to [facility] on [address]

## 2019-02-20 NOTE — PATIENT INSTRUCTIONS
Learning About Degenerative Disc Disease What is degenerative disc disease? Degenerative disc disease is not really a disease. It's a term used to describe the normal changes in your spinal discs as you age. Spinal discs are small, spongy discs that separate the bones (vertebrae) that make up the spine. The discs act as shock absorbers for the spine, so it can flex, bend, and twist. 
Degenerative disc disease can take place in one or more places along the spine. It most often occurs in the discs in the lower back and the neck. What causes it? As we age, our spinal discs break down, or degenerate. This breakdown causes the symptoms of degenerative disc disease in some people. When the discs break down, they can lose fluid and dry out, and their outer layers can have tiny cracks or tears. This leads to less padding and less space between the bones in the spine. The body reacts to this by making bony growths on the spine called bone spurs. These spurs can press on the spinal nerve roots or spinal cord. This can cause pain and can affect how well the nerves work. What are the symptoms? Many people with degenerative disc disease have no pain. But others have severe pain or other symptoms that limit their activities. Some of the most common symptoms are: 
· Pain in the back or neck. Where the pain occurs depends on which discs are affected. · Pain that gets worse when you move, such as bending over, reaching up, or twisting. · Pain that may occur in the rear end (buttocks), arm, or leg if a nerve is pinched. · Numbness or tingling in your arm or leg. How is it diagnosed? A doctor can often diagnose degenerative disc disease while doing a physical exam. If your exam shows no signs of a serious condition, imaging tests (such as an X-ray) aren't likely to help your doctor find the cause of your symptoms.  
Sometimes degenerative disc disease is found when an X-ray is taken for another reason, such as an injury or other health problem. But even if the doctor finds degenerative disc disease, that doesn't always mean that you will have symptoms. How is it treated? There are several things you can do at home to manage pain from this problem. · To relieve pain, use ice or heat (whichever feels better) on the affected area. ? Put ice or a cold pack on the area for 10 to 20 minutes at a time. Put a thin cloth between the ice and your skin. ? Put a warm water bottle, a heating pad set on low, or a warm cloth on your back. Put a thin cloth between the heating pad and your skin. Do not go to sleep with a heating pad on your skin. · Ask your doctor if you can take acetaminophen (such as Tylenol) or nonsteroidal anti-inflammatory drugs, such as ibuprofen or naproxen. Your doctor can prescribe stronger medicines if needed. Be safe with medicines. Read and follow all instructions on the label. · Get some exercise every day. Exercise is one of the best ways to help your back feel better and stay better. It's best to start each exercise slowly. You may notice a little soreness, and that's okay. But if an exercise makes your pain worse, stop doing it. Here are things you can try: 
? Walking. It's the simplest and maybe the best activity for your back. It gets your blood moving and helps your muscles stay strong. ? Exercises that gently stretch and strengthen your stomach, back, and leg muscles. The stronger those muscles are, the better they're able to protect your back. If you have constant or severe pain in your back or spine, you may need other treatments, such as physical therapy. In some cases, your doctor may suggest surgery. Follow-up care is a key part of your treatment and safety. Be sure to make and go to all appointments, and call your doctor if you are having problems. It's also a good idea to know your test results and keep a list of the medicines you take. Where can you learn more? Go to http://irma-randy.info/. Enter S592 in the search box to learn more about \"Learning About Degenerative Disc Disease. \" Current as of: September 20, 2018 Content Version: 11.9 © 3433-4084 [employer]. Care instructions adapted under license by [employer] (which disclaims liability or warranty for this information). If you have questions about a medical condition or this instruction, always ask your healthcare professional. [place] [address] any warranty or liability for your use of this information.

## 2019-04-10 ENCOUNTER — OFFICE VISIT (OUTPATIENT)
Dept: ORTHOPEDIC SURGERY | Age: 74
End: 2019-04-10

## 2019-04-10 VITALS
OXYGEN SATURATION: 100 % | HEART RATE: 86 BPM | SYSTOLIC BLOOD PRESSURE: 179 MMHG | WEIGHT: 162.6 LBS | TEMPERATURE: 97.8 F | HEIGHT: 64 IN | BODY MASS INDEX: 27.76 KG/M2 | DIASTOLIC BLOOD PRESSURE: 77 MMHG | RESPIRATION RATE: 16 BRPM

## 2019-04-10 DIAGNOSIS — M51.36 DDD (DEGENERATIVE DISC DISEASE), LUMBAR: Primary | ICD-10-CM

## 2019-04-10 DIAGNOSIS — Z98.1 HX OF SPINAL FUSION: ICD-10-CM

## 2019-04-10 DIAGNOSIS — R26.2 AMBULATORY DYSFUNCTION: ICD-10-CM

## 2019-04-10 RX ORDER — DICLOFENAC SODIUM 10 MG/G
4 GEL TOPICAL
Qty: 500 G | Refills: 5 | Status: SHIPPED | OUTPATIENT
Start: 2019-04-10

## 2019-04-10 NOTE — PROGRESS NOTES
[address]  [address], [address]  [place]  Phone: [phone]  Fax: [phone]        Beti Mack  : 1945  PCP: Maynor Haas MD    PROGRESS NOTE      ASSESSMENT AND PLAN    Diagnoses and all orders for this visit:    1. DDD (degenerative disc disease), lumbar  -     MRI LUMB SPINE W WO CONT; Future    2. Hx of spinal fusion, L3 to sacrum  -     MRI LUMB SPINE W WO CONT; Future    3. Ambulatory dysfunction  -     MRI LUMB SPINE W WO CONT; Future    Other orders  -     diclofenac (VOLTAREN) 1 % gel; Apply 4 g to affected area four (4) times daily as needed for Pain. 1. Advised to continue HEP. Recommend silver sneakers. 2. Risk benefits, alternatives, and limitations of RFA discussed with pt.  3. Discussed RFA, surgery as treatment options  4. MRI lumbar spine - progressive low back pain, difficulty standing and walking, Hx of fusion. 5. Trial of Voltaren gel  6. Given information on MBB    F/U after MRI      HISTORY OF PRESENT ILLNESS  Golden Dickerson is a 66 y.o. female. Pt presents to the office for a f/u visit for low back pain. Last OV referred to [facility]. Pt reports benefit with physical therapy, she is less stiff. Pt states she still has trouble with standing upright. She reports numbness in BLE with walking and feeling as though her legs will give out. She notes she has greatly reduced pain with sitting. Location of pain: low back  Does pain radiate into extremities: BLE numbness with walking  Does patient have weakness: BLE give out with much walking   Pt denies saddle paresthesias. Medications pt is on: Tylenol PRN, not much benefit. Aspercreme with benefit. Old Goat cream PRN, uncertain benefit. Heat with benefit. Pt denies recent ED visits or hospitalizations. Denies persistent fevers, chills, weight changes, neurogenic bowel or bladder symptoms.      Treatments patient has tried:  Physical therapy:Yes [facility] 2019 with some benefit  Doing HEP: Some  Non-opioid medications: Yes  Spinal injections: Yes Dr. Hamilton Olszewski temporary benefit  Spinal surgery- Yes. 2000 and 2002 lumbar surgery - L3 to sacrum fusion- Dr. Kannan Cavanaugh and Dr. Diaz Askew: 12-18 month benefit each  Last L MRI 2012: moderate foraminal stenosis L3-4-5-S1. Spondylolisthesis L3-4.  reviewed. PMHx of MDS, DM, glaucoma, L CTS, breast cancer. Pain Assessment  4/10/2019   Location of Pain Back;Leg   Location Modifiers Right;Left   Severity of Pain 2   Quality of Pain Dull;Aching   Quality of Pain Comment -   Duration of Pain -   Frequency of Pain Constant   Date Pain First Started -   Aggravating Factors Standing   Aggravating Factors Comment -   Limiting Behavior -   Relieving Factors Rest;Heat   Relieving Factors Comment sitting   Result of Injury -       PAST MEDICAL HISTORY   Past Medical History:   Diagnosis Date    Anemia NEC     Arthritis     Breast cancer ([facility] [address] 2015    Diabetes ([facility] [address]     Glaucoma     Hypertension     Lumbar stenosis     MDS (myelodysplastic syndrome) (HCC)     MGUS (monoclonal gammopathy of unknown significance)        Past Surgical History:   Procedure Laterality Date    BIOPSY BONE MARROW  5/11/2010    ENDOSCOPY, COLON, DIAGNOSTIC  2005    HX BREAST LUMPECTOMY  2015    HX CARPAL TUNNEL RELEASE      Left    HX CATARACT REMOVAL Bilateral 2012    HX HYSTERECTOMY  6/1985    HX LUMBAR FUSION  2002    Dr. Mitra Knapp, L3-S1    HX LUMBAR LAMINECTOMY  3/2000    NEUROLOGICAL PROCEDURE UNLISTED  2002    Spinal injection   . MEDICATIONS      Current Outpatient Medications   Medication Sig Dispense Refill    fluticasone propionate (FLONASE NA) by Nasal route.  loratadine (CLARITIN PO) Take  by mouth.  diclofenac (VOLTAREN) 1 % gel Apply 4 g to affected area four (4) times daily as needed for Pain.  500 g 5    mth-me blue-sod phos-phsal-hyo (URIBEL) 118-10-40.8-36 mg cap capsule Take 1 Cap by mouth four (4) times daily. 30 Cap 0    acetaminophen (TYLENOL) 325 mg tablet Take 325 mg by mouth as needed.  aspirin delayed-release 81 mg tablet 81 mg.      losartan-hydrochlorothiazide (HYZAAR) 100-12.5 mg per tablet Take 1 Tab by mouth daily.  NOVOTWIST 32 x 1/5 \" ndle       multivitamin with iron tablet Take 1 Tab by mouth daily. 30 Each 5    Omeprazole delayed release (PRILOSEC D/R) 20 mg tablet Take 20 mg by mouth daily.  glipiZIDE (GLUCOTROL) 10 mg tablet Take 10 mg by mouth two (2) times a day.  carvedilol (COREG) 12.5 mg tablet Take  by mouth two (2) times daily (with meals).  metFORMIN (GLUCOPHAGE) 500 mg tablet Take  by mouth two (2) times daily (with meals).  travoprost (TRAVATAN Z) 0.004 % ophthalmic solution Administer 1 Drop to both eyes every evening.             ALLERGIES  No Known Allergies       SOCIAL HISTORY    Social History     Socioeconomic History    Marital status:      Spouse name: Not on file    Number of children: Not on file    Years of education: Not on file    Highest education level: Not on file   Occupational History    Not on file   Social Needs    Financial resource strain: Not on file    Food insecurity:     Worry: Not on file     Inability: Not on file    Transportation needs:     Medical: Not on file     Non-medical: Not on file   Tobacco Use    Smoking status: Former Smoker     Packs/day: 0.50     Last attempt to quit: 1994     Years since quittin.3    Smokeless tobacco: Never Used   Substance and Sexual Activity    Alcohol use: No    Drug use: No    Sexual activity: Not on file   Lifestyle    Physical activity:     Days per week: Not on file     Minutes per session: Not on file    Stress: Not on file   Relationships    Social connections:     Talks on phone: Not on file     Gets together: Not on file     Attends Alevism service: Not on file     Active member of club or organization: Not on file     Attends meetings of clubs or organizations: Not on file     Relationship status: Not on file    Intimate partner violence:     Fear of current or ex partner: Not on file     Emotionally abused: Not on file     Physically abused: Not on file     Forced sexual activity: Not on file   Other Topics Concern    Not on file   Social History Narrative    Not on file       FAMILY HISTORY    Family History   Problem Relation Age of Onset    Cancer Mother         Colon cancer    Heart Attack Mother     Asthma Father     Kidney Disease Father         Renal failure    Cancer Sister         Colon cancer       REVIEW OF SYSTEMS  Review of Systems   Constitutional: Negative for chills, fever and weight loss. Respiratory: Negative for shortness of breath. Cardiovascular: Negative for chest pain. Gastrointestinal: Negative for constipation. Negative for fecal incontinence   Genitourinary: Negative for dysuria. Negative for urinary incontinence   Musculoskeletal:        Per HPI   Skin: Negative for rash. Neurological: Positive for tingling. Negative for dizziness, tremors, focal weakness and headaches. Endo/Heme/Allergies: Does not bruise/bleed easily. Psychiatric/Behavioral: The patient does not have insomnia. PHYSICAL EXAMINATION  Visit Vitals  /77   Pulse 86   Temp 97.8 °F (36.6 °C)   Resp 16   Ht 5' 4\" (1.626 m)   Wt 162 lb 9.6 oz (73.8 kg)   SpO2 100%   BMI 27.91 kg/m²         Accompanied by self. Constitutional:  Well developed, well nourished, in no acute distress. Psychiatric: Affect and mood are appropriate. Integumentary: No rashes or abrasions noted on exposed areas. Cardiovascular/Peripheral Vascular: Intact l pulses. No peripheral edema is noted BLE. Lymphatic:  No evidence of lymphedema. No cervical lymphadenopathy. SPINE/MUSCULOSKELETAL EXAM      Lumbar spine:  No rash, ecchymosis, or gross obliquity. No fasciculations. No focal atrophy is noted.    Tenderness to palpation L4-5. No tenderness to palpation at the sciatic notch. SI joints non-tender. Trochanters non tender. Sensation grossly intact to light touch. MOTOR:       Hip Flex  Quads Hamstrings Ankle DF EHL Ankle PF   Right +4/5 +4/5 +4/5 +4/5 +4/5 +4/5   Left +4/5 +4/5 +4/5 +4/5 +4/5 +4/5       Straight Leg raise negative. Ambulation without assistive device. FWB. FF. Written by Emery Osorio, as dictated by Kala Hansen MD.    I, Dr. Kala Hansen MD, confirm that all documentation is accurate. Ms. Jayesh Gupta may have a reminder for a \"due or due soon\" health maintenance. I have asked that she contact her primary care provider for follow-up on this health maintenance.

## 2019-04-10 NOTE — PROGRESS NOTES
Verbal order entered per Dr. Maggie Euceda as documented on blue sheet:MRI L-spine w+wo due to progressive LBP, difficulty standing/walking, hx of fusion. Voltaren gel apply 4g QID prn pain.  Disp 500g with 5 refills

## 2019-04-10 NOTE — PATIENT INSTRUCTIONS
Learning About Medial Branch Block and Neurotomy  What are medial branch block and neurotomy? Facet joints connect your vertebrae to each other. Problems in these joints can cause chronic (long-term) pain in the neck or back. They can sometimes affect the shoulders, arms, buttocks, or legs. Medial branch nerves are the nerves that carry many of the pain messages from your facet joints. Radiofrequency medial branch neurotomy is a type of medial branch neurotomy that is used to relieve arthritis pain. It uses radio waves to damage nerves in your neck or back so that they can no longer send pain messages to your brain. Before your doctor knows if a neurotomy will help you, he or she will do a medial branch block to find out if certain nerves are the ones that are a source of your pain. You will need two separate visits to the outpatient center or hospital to have both procedures. How is a medial branch block done? The doctor will use a tiny needle to numb the skin where you will get the block. Then he or she puts the block needle into the numbed area. You may feel some pressure, but you should not feel pain. Using fluoroscopy (live X-ray) to guide the needle, the doctor injects medicine onto one or more nerves to make them numb. If you get relief from your pain in the next 4 to 6 hours, it's a sign that those nerves may be contributing to your pain. The relief will last only a short time. You may then have a medial branch neurotomy at a later visit to try to get longer relief. It takes 20 to 30 minutes to get the block. You can go home after the doctor watches you for about an hour. You will get instructions on how to report how much pain you have when you are at home. You will need someone to drive you home. How is medial branch neurotomy done? The doctor will use a tiny needle to numb the skin where you will get the neurotomy. Then he or she puts the neurotomy needle into the numbed area.  You may feel some pressure. Using fluoroscopy (live X-ray) to guide the needle, the doctor sends radio waves through the needle to the nerve for 60 to 90 seconds. The radio waves heat the nerve, which damages it. The doctor may do this several times. And he or she may treat more than one nerve. It takes 45 to 90 minutes to get a neurotomy, depending on how many nerves are heated. You will probably go home 30 to 60 minutes later. You will need someone to drive you home. What can you expect after a neurotomy? You may feel a little sore or tender at the injection site at first. But after a successful neurotomy, most people have pain relief right away. It often lasts for 9 to 12 months or longer. Sometimes the pain relief is permanent. If your pain does come back, it may mean that the damaged nerve has healed and can send pain messages again. Or it can mean that a different nerve is causing pain. Your doctor will discuss your options with you. Follow-up care is a key part of your treatment and safety. Be sure to make and go to all appointments, and call your doctor if you are having problems. It's also a good idea to know your test results and keep a list of the medicines you take. Where can you learn more? Go to http://irma-randy.info/. Enter Z317 in the search box to learn more about \"Learning About Medial Branch Block and Neurotomy. \"  Current as of: Leonor 3, 2018  Content Version: 11.9  © 9471-6071 [employer]. Care instructions adapted under license by [employer] (which disclaims liability or warranty for this information). If you have questions about a medical condition or this instruction, always ask your healthcare professional. [place] [address] any warranty or liability for your use of this information.

## 2019-04-11 ENCOUNTER — DOCUMENTATION ONLY (OUTPATIENT)
Dept: ORTHOPEDIC SURGERY | Age: 74
End: 2019-04-11

## 2019-04-11 NOTE — PROGRESS NOTES
Order and office notes faxed to [facility] Scheduling to have them set up MRI L-Spine and to notify the patient of date. They will authorize with the insurance if needed. Patient aware.

## 2019-05-08 DIAGNOSIS — M51.36 DDD (DEGENERATIVE DISC DISEASE), LUMBAR: ICD-10-CM

## 2019-05-08 DIAGNOSIS — Z98.1 HX OF SPINAL FUSION: ICD-10-CM

## 2019-05-08 DIAGNOSIS — R26.2 AMBULATORY DYSFUNCTION: ICD-10-CM

## 2019-05-15 ENCOUNTER — OFFICE VISIT (OUTPATIENT)
Dept: ORTHOPEDIC SURGERY | Age: 74
End: 2019-05-15

## 2019-05-15 VITALS
OXYGEN SATURATION: 100 % | BODY MASS INDEX: 27.35 KG/M2 | SYSTOLIC BLOOD PRESSURE: 143 MMHG | RESPIRATION RATE: 16 BRPM | TEMPERATURE: 97.9 F | HEART RATE: 77 BPM | WEIGHT: 160.2 LBS | DIASTOLIC BLOOD PRESSURE: 78 MMHG | HEIGHT: 64 IN

## 2019-05-15 DIAGNOSIS — Z98.1 HISTORY OF LUMBAR FUSION: ICD-10-CM

## 2019-05-15 DIAGNOSIS — G95.89 INTRADURAL MASS (HCC): Primary | ICD-10-CM

## 2019-05-15 DIAGNOSIS — M47.816 LUMBAR SPONDYLOSIS: ICD-10-CM

## 2019-05-15 RX ORDER — MELOXICAM 7.5 MG/1
TABLET ORAL
Qty: 30 TAB | Refills: 0 | Status: SHIPPED | OUTPATIENT
Start: 2019-05-15

## 2019-05-15 NOTE — PATIENT INSTRUCTIONS
Learning About How to Have a Healthy Back  What causes back pain? Back pain is often caused by overuse, strain, or injury. For example, people often hurt their backs playing sports or working in the yard, being jolted in a car accident, or lifting something too heavy. Aging plays a part too. Your bones and muscles tend to lose strength as you age, which makes injury more likely. The spongy discs between the bones of the spine (vertebrae) may suffer from wear and tear and no longer provide enough cushion between the bones. A disc that bulges or breaks open (herniated disc) can press on nerves, causing back pain. In some people, back pain is the result of arthritis, broken vertebrae caused by bone loss (osteoporosis), illness, or a spine problem. Although most people have back pain at one time or another, there are steps you can take to make it less likely. How can you have a healthy back? Reduce stress on your back through good posture  Slumping or slouching alone may not cause low back pain. But after the back has been strained or injured, bad posture can make pain worse. · Sleep in a position that maintains your back's normal curves and on a mattress that feels comfortable. Sleep on your side with a pillow between your knees, or sleep on your back with a pillow under your knees. These positions can reduce strain on your back. · Stand and sit up straight. \"Good posture\" generally means your ears, shoulders, and hips are in a straight line. · If you must stand for a long time, put one foot on a stool, ledge, or box. Switch feet every now and then. · Sit in a chair that is low enough to let you place both feet flat on the floor with both knees nearly level with your hips. If your chair or desk is too high, use a footrest to raise your knees. Place a small pillow, a rolled-up towel, or a lumbar roll in the curve of your back if you need extra support.   · Try a kneeling chair, which helps tilt your hips forward. This takes pressure off your lower back. · Try sitting on an exercise ball. It can rock from side to side, which helps keep your back loose. · When driving, keep your knees nearly level with your hips. Sit straight, and drive with both hands on the steering wheel. Your arms should be in a slightly bent position. Reduce stress on your back through careful lifting  · Squat down, bending at the hips and knees only. If you need to, put one knee to the floor and extend your other knee in front of you, bent at a right angle (half kneeling). · Press your chest straight forward. This helps keep your upper back straight while keeping a slight arch in your low back. · Hold the load as close to your body as possible, at the level of your belly button (navel). · Use your feet to change direction, taking small steps. · Lead with your hips as you change direction. Keep your shoulders in line with your hips as you move. · Set down your load carefully, squatting with your knees and hips only. Exercise and stretch your back  · Do some exercise on most days of the week, if your doctor says it is okay. You can walk, run, swim, or cycle. · Stretch your back muscles. Here are a few exercises to try:  ? Lie on your back, and gently pull one bent knee to your chest. Put that foot back on the floor, and then pull the other knee to your chest.  ? Do pelvic tilts. Lie on your back with your knees bent. Tighten your stomach muscles. Pull your belly button (navel) in and up toward your ribs. You should feel like your back is pressing to the floor and your hips and pelvis are slightly lifting off the floor. Hold for 6 seconds while breathing smoothly. ? Sit with your back flat against a wall. · Keep your core muscles strong. The muscles of your back, belly (abdomen), and buttocks support your spine. ? Pull in your belly and imagine pulling your navel toward your spine. Hold this for 6 seconds, then relax.  Remember to keep breathing normally as you tense your muscles. ? Do curl-ups. Always do them with your knees bent. Keep your low back on the floor, and curl your shoulders toward your knees using a smooth, slow motion. Keep your arms folded across your chest. If this bothers your neck, try putting your hands behind your neck (not your head), with your elbows spread apart. ? Lie on your back with your knees bent and your feet flat on the floor. Tighten your belly muscles, and then push with your feet and raise your buttocks up a few inches. Hold this position 6 seconds as you continue to breathe normally, then lower yourself slowly to the floor. Repeat 8 to 12 times. ? If you like group exercise, try Pilates or yoga. These classes have poses that strengthen the core muscles. Lead a healthy lifestyle  · Stay at a healthy weight to avoid strain on your back. · Do not smoke. Smoking increases the risk of osteoporosis, which weakens the spine. If you need help quitting, talk to your doctor about stop-smoking programs and medicines. These can increase your chances of quitting for good. Where can you learn more? Go to http://irma-randy.info/. Enter L315 in the search box to learn more about \"Learning About How to Have a Healthy Back. \"  Current as of: September 20, 2018  Content Version: 11.9  © 2948-2230 [employer]. Care instructions adapted under license by [employer] (which disclaims liability or warranty for this information). If you have questions about a medical condition or this instruction, always ask your healthcare professional. [place] [address] any warranty or liability for your use of this information.

## 2019-05-15 NOTE — PROGRESS NOTES
[facility] [address]  [address], [address]  [place], [facility] [address]  Phone: [phone]  Fax: [phone]        Baldo Reasons  : 1945  PCP: Annabelle Drummond MD    PROGRESS NOTE      ASSESSMENT AND PLAN    Diagnoses and all orders for this visit:    1. Intradural mass (HCC)  -     REFERRAL TO NEUROLOGY    2. Lumbar spondylosis    3. History of lumbar fusion    Other orders  -     meloxicam (MOBIC) 7.5 mg tablet; Take 1 tab po every day to BID with food as needed for pain       1. 65 yo with upper lumbar junctional kyphosis, pain, and L2 intradural lesion. Would appreciate neurosx opinion. 2. Referral to neurosurgeon Dr. Heather Mcdaniels  3. Trial of Mobic 7.5mg  4. Given information on back care    F/U PRN for spinal injections, poss BASIM/RF      HISTORY OF PRESENT ILLNESS  Nerissa Ohara is a 66 y.o. female. Last visit pt was sent to have a lumbar spine MRI. Images reviewed with the pt. Enhancing round lesion at L2. Radiology opinion= benign, chronic. Last OV given trial of Voltaren gel. Hx breast CA. Pt c/o pain with standing upright in back and this causes pain into her BLE. She affirms sleeping well at night. She c/o most pain with moving. She reports about 5-10 minute standing and walking tolerance. Pt notes she had rollator for bad days. Pt notes her neighbor told her to try Mobic cause it helped him a lot. Location of pain: low back  Does pain radiate into extremities: BLE numbness with walking  Does patient have weakness: BLE give out with much walking   Pt denies saddle paresthesias.    Medications pt is on: Voltaren gel with some benefit. Tylenol PRN, not much benefit.  Aspercreme with benefit. Old Goat cream PRN, uncertain benefit. Heat with benefit. Pt denies recent ED visits or hospitalizations. Denies persistent fevers, chills, weight changes, neurogenic bowel or bladder symptoms. Pt denies any recent GI ulcers, bleeds or renal dysfucntion. Treatments patient has tried:  Physical therapy:Yes aqua 2019 with some benefit  Doing HEP: Some  Non-opioid medications: Yes  Spinal injections: Yes Dr. Oseguera temporary benefit  Spinal surgery- Yes. 2000 and 2002 lumbar surgery - L3 to sacrum fusion- Dr. Brandi Jj and Dr. Jaime Dias: 12-18 month benefit each  Last L MRI 2019: intact fusion L3 to S1. Intradural lesion likely scar L2.  reviewed. PMHx of MDS, DM, glaucoma, L CTS, breast cancer.     Pain Assessment  5/15/2019   Location of Pain Back;Buttocks;Leg   Location Modifiers Right;Left   Severity of Pain 3   Quality of Pain Dull;Aching; Other (Comment)   Quality of Pain Comment Stiffness    Duration of Pain Persistent   Frequency of Pain Constant   Date Pain First Started -   Aggravating Factors Walking;Standing   Aggravating Factors Comment -   Limiting Behavior Yes   Relieving Factors Heat   Relieving Factors Comment -   Result of Injury No         MRI lumbar spine 4/30/19  Result Impression     1. Evidence of laminectomy L3-S1 with posterior spinal fusion L3-S1. No left L3 pedicle screw. The hardware appears in good position by MRI. The posterior fusion appears likely solid where visible. There is no recurrent or residual significant central or lateral stenosis at these operative levels. 2. No high-grade junctional central stenosis at the level above the fusion at L2-L3. 3. Convincing small T2 hypointense peripherally enhancing intradural lesion at the inferior L2 level just above the laminectomy, fairly clearly intradural. This appears more prominent than previous but was present in retrospect previously including the 2001 exam and is likely unchanged but better seen because of higher quality current exam. Benign etiology is favored.  Given prominent T2 hypointensity, this could represent chronic scarring that is masslike perhaps perhaps an area of nodular fibrosis or intradural scarring associated with surgery or a retained intradural disc fragment or perhaps related to dural tear repair. Given that this lesion has been present previously although better seen and is likely not much changed in size, this is felt to be of little clinical significance. Of note there are no other prominent changes of arachnoiditis. 4. No additional evidence of herniation or high-grade stenosis. PAST MEDICAL HISTORY   Past Medical History:   Diagnosis Date    Anemia NEC     Arthritis     Breast cancer ([facility] [address] 2015    Diabetes ([facility] [address]     Glaucoma     Hypertension     Lumbar stenosis     MDS (myelodysplastic syndrome) ([facility] [address]     MGUS (monoclonal gammopathy of unknown significance)        Past Surgical History:   Procedure Laterality Date    BIOPSY BONE MARROW  5/11/2010    ENDOSCOPY, COLON, DIAGNOSTIC  2005    HX BREAST LUMPECTOMY  2015    HX CARPAL TUNNEL RELEASE      Left    HX CATARACT REMOVAL Bilateral 2012    HX HYSTERECTOMY  6/1985    HX LUMBAR FUSION  2002    Dr. Benny De La Cruz, L3-S1    HX LUMBAR LAMINECTOMY  3/2000    NEUROLOGICAL PROCEDURE UNLISTED  2002    Spinal injection   . MEDICATIONS      Current Outpatient Medications   Medication Sig Dispense Refill    meloxicam (MOBIC) 7.5 mg tablet Take 1 tab po every day to BID with food as needed for pain 30 Tab 0    fluticasone propionate (FLONASE NA) by Nasal route.  loratadine (CLARITIN PO) Take  by mouth.  diclofenac (VOLTAREN) 1 % gel Apply 4 g to affected area four (4) times daily as needed for Pain. 500 g 5    mth-me blue-sod phos-phsal-hyo (URIBEL) 118-10-40.8-36 mg cap capsule Take 1 Cap by mouth four (4) times daily. 30 Cap 0    acetaminophen (TYLENOL) 325 mg tablet Take 325 mg by mouth as needed.  losartan-hydrochlorothiazide (HYZAAR) 100-12.5 mg per tablet Take 1 Tab by mouth daily.  multivitamin with iron tablet Take 1 Tab by mouth daily. 30 Each 5    Omeprazole delayed release (PRILOSEC D/R) 20 mg tablet Take 20 mg by mouth daily.       glipiZIDE (GLUCOTROL) 10 mg tablet Take 10 mg by mouth two (2) times a day.  carvedilol (COREG) 12.5 mg tablet Take  by mouth two (2) times daily (with meals).  metFORMIN (GLUCOPHAGE) 500 mg tablet Take  by mouth two (2) times daily (with meals).  travoprost (TRAVATAN Z) 0.004 % ophthalmic solution Administer 1 Drop to both eyes every evening.         aspirin delayed-release 81 mg tablet 81 mg.      NOVOTWIST 32 x 1/5 \" ndle           ALLERGIES  No Known Allergies       SOCIAL HISTORY    Social History     Socioeconomic History    Marital status:      Spouse name: Not on file    Number of children: Not on file    Years of education: Not on file    Highest education level: Not on file   Occupational History    Not on file   Social Needs    Financial resource strain: Not on file    Food insecurity:     Worry: Not on file     Inability: Not on file    Transportation needs:     Medical: Not on file     Non-medical: Not on file   Tobacco Use    Smoking status: Former Smoker     Packs/day: 0.50     Last attempt to quit: 1994     Years since quittin.4    Smokeless tobacco: Never Used   Substance and Sexual Activity    Alcohol use: No    Drug use: No    Sexual activity: Not on file   Lifestyle    Physical activity:     Days per week: Not on file     Minutes per session: Not on file    Stress: Not on file   Relationships    Social connections:     Talks on phone: Not on file     Gets together: Not on file     Attends [employer] service: Not on file     Active member of club or organization: Not on file     Attends meetings of clubs or organizations: Not on file     Relationship status: Not on file    Intimate partner violence:     Fear of current or ex partner: Not on file     Emotionally abused: Not on file     Physically abused: Not on file     Forced sexual activity: Not on file   Other Topics Concern    Not on file   Social History Narrative    Not on file       FAMILY HISTORY    Family History Problem Relation Age of Onset    Cancer Mother         Colon cancer    Heart Attack Mother     Asthma Father     Kidney Disease Father         Renal failure    Cancer Sister         Colon cancer       REVIEW OF SYSTEMS  Review of Systems   Constitutional: Negative for chills, fever and weight loss. Respiratory: Negative for shortness of breath. Cardiovascular: Negative for chest pain. Gastrointestinal: Negative for constipation. Negative for fecal incontinence   Genitourinary: Negative for dysuria. Negative for urinary incontinence   Musculoskeletal: Positive for back pain. Per HPI   Skin: Negative for rash. Neurological: Positive for tingling. Negative for dizziness, tremors, focal weakness and headaches. Endo/Heme/Allergies: Does not bruise/bleed easily. Psychiatric/Behavioral: The patient does not have insomnia. PHYSICAL EXAMINATION  Visit Vitals  /78   Pulse 77   Temp 97.9 °F (36.6 °C)   Resp 16   Ht 5' 4\" (1.626 m)   Wt 160 lb 3.2 oz (72.7 kg)   SpO2 100%   BMI 27.50 kg/m²         Accompanied by self. Constitutional:  Well developed, well nourished, in no acute distress. Psychiatric: Affect and mood are appropriate. Integumentary: No rashes or abrasions noted on exposed areas. Cardiovascular/Peripheral Vascular: Intact l pulses. No peripheral edema is noted BLE. Lymphatic:  No evidence of lymphedema. No cervical lymphadenopathy. SPINE/MUSCULOSKELETAL EXAM      Lumbar spine:  Kyphotic, unable to stand at neutral.  No rash, ecchymosis, or gross obliquity. No fasciculations. No focal atrophy is noted. Tenderness to palpation upper lumbar midline. No tenderness to palpation at the sciatic notch. SI joints non-tender. Trochanters non tender. MOTOR:       Hip Flex  Quads Hamstrings Ankle DF EHL Ankle PF   Right +4/5 +4/5 +4/5 +4/5 +4/5 +4/5   Left +4/5 +4/5 +4/5 +4/5 +4/5 +4/5     Straight Leg raise negative.      Ambulation with quad cane. FWB. Unable to stand at neutral.      Written by Imelda Verma, as dictated by Gaynel Favre, MD.    I, Dr. Gaynel Favre, MD, confirm that all documentation is accurate. Ms. Lee See may have a reminder for a \"due or due soon\" health maintenance. I have asked that she contact her primary care provider for follow-up on this health maintenance.

## 2020-03-23 RX ORDER — MELOXICAM 7.5 MG/1
TABLET ORAL
Qty: 30 TAB | Refills: 0 | OUTPATIENT
Start: 2020-03-23

## 2020-03-24 NOTE — TELEPHONE ENCOUNTER
Called patient [phone] and verified her name and date of birth. I informed patient that we received a refill request for the Meloxicam from [facility]. Per Dr. Herminia Palacio it has been over 9 months since she you have been seen. You can either contact your PCP or we can schedule you for a video visit. Patient inquired is that one of those things she has to call into. I informed her she would have to set up her MyChart. Patient states she is an old dinosaur and she will try calling her PCP for a refill. Patient verbalized understanding. No further action needed at this time.